# Patient Record
Sex: FEMALE | Race: BLACK OR AFRICAN AMERICAN | NOT HISPANIC OR LATINO | Employment: OTHER | URBAN - METROPOLITAN AREA
[De-identification: names, ages, dates, MRNs, and addresses within clinical notes are randomized per-mention and may not be internally consistent; named-entity substitution may affect disease eponyms.]

---

## 2017-02-20 ENCOUNTER — OFFICE VISIT (OUTPATIENT)
Dept: INTERNAL MEDICINE | Facility: CLINIC | Age: 62
End: 2017-02-20
Payer: MEDICARE

## 2017-02-20 ENCOUNTER — LAB VISIT (OUTPATIENT)
Dept: LAB | Facility: HOSPITAL | Age: 62
End: 2017-02-20
Attending: FAMILY MEDICINE
Payer: MEDICARE

## 2017-02-20 VITALS
HEIGHT: 62 IN | WEIGHT: 250.44 LBS | TEMPERATURE: 97 F | SYSTOLIC BLOOD PRESSURE: 112 MMHG | BODY MASS INDEX: 46.09 KG/M2 | OXYGEN SATURATION: 98 % | HEART RATE: 90 BPM | DIASTOLIC BLOOD PRESSURE: 78 MMHG

## 2017-02-20 DIAGNOSIS — R73.02 IGT (IMPAIRED GLUCOSE TOLERANCE): ICD-10-CM

## 2017-02-20 DIAGNOSIS — E78.00 HYPERCHOLESTEREMIA: ICD-10-CM

## 2017-02-20 DIAGNOSIS — I10 ESSENTIAL HYPERTENSION: Primary | ICD-10-CM

## 2017-02-20 DIAGNOSIS — E66.01 MORBID OBESITY, UNSPECIFIED OBESITY TYPE: ICD-10-CM

## 2017-02-20 LAB
CHOLEST/HDLC SERPL: 7.8 {RATIO}
GLUCOSE SERPL-MCNC: 98 MG/DL
HDL/CHOLESTEROL RATIO: 12.8 %
HDLC SERPL-MCNC: 234 MG/DL
HDLC SERPL-MCNC: 30 MG/DL
LDLC SERPL CALC-MCNC: 171.8 MG/DL
NONHDLC SERPL-MCNC: 204 MG/DL
TRIGL SERPL-MCNC: 161 MG/DL

## 2017-02-20 PROCEDURE — 82947 ASSAY GLUCOSE BLOOD QUANT: CPT

## 2017-02-20 PROCEDURE — 36415 COLL VENOUS BLD VENIPUNCTURE: CPT | Mod: PO

## 2017-02-20 PROCEDURE — 80061 LIPID PANEL: CPT

## 2017-02-20 PROCEDURE — 99999 PR PBB SHADOW E&M-EST. PATIENT-LVL III: CPT | Mod: PBBFAC,,, | Performed by: FAMILY MEDICINE

## 2017-02-20 PROCEDURE — 83036 HEMOGLOBIN GLYCOSYLATED A1C: CPT

## 2017-02-20 PROCEDURE — 99214 OFFICE O/P EST MOD 30 MIN: CPT | Mod: S$PBB,,, | Performed by: FAMILY MEDICINE
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
## 2017-02-20 NOTE — MR AVS SNAPSHOT
[facility]  [address] [place] [address]  Phone: [phone]  Fax: [phone]                  Kelsey Terrell   2017 10:20 AM   Office Visit    Description:  Female : 1955   Provider:  Harris Scott MD   Department:  [facility] - Internal Medicine           Diagnoses this Visit        Comments    Essential hypertension    -  Primary     Morbid obesity, unspecified obesity type         Hypercholesteremia                To Do List           Future Appointments        Provider Department Dept Phone    2017 2:20 PM LABORATORY, [facility] [phone]      Goals (5 Years of Data)     None      [facility] On Call     [facility] -  Assistance  Registered nurses in the [facility] provide clinical advisement, health education, appointment booking, and other advisory services.  Call for this free service at [phone].             Medications           Message regarding Medications     Verify the changes and/or additions to your medication regime listed below are the same as discussed with your clinician today.  If any of these changes or additions are incorrect, please notify your healthcare provider.             Verify that the below list of medications is an accurate representation of the medications you are currently taking.  If none reported, the list may be blank. If incorrect, please contact your healthcare provider. Carry this list with you in case of emergency.           Current Medications     aspirin (ECOTRIN) 81 MG EC tablet Take 81 mg by mouth once daily. 1 Tablet, Delayed Release (E.C.) Oral Every day    lorazepam (ATIVAN) 0.5 MG tablet Take 0.5 mg by mouth 2 (two) times daily.     sertraline (ZOLOFT) 100 MG tablet Take 100 mg by mouth every evening.     TRIBENZOR 40-10-25 mg Tab Take 1 tablet by mouth once daily.     venlafaxine (EFFEXOR-XR) 150 MG Cp24 Take by mouth once daily.           "  Clinical Reference Information           Your Vitals Were     BP Pulse Temp Height    112/78 (BP Location: Right arm, Patient Position: Sitting, BP Method: Manual) 90 96.8 °F (36 °C) (Tympanic) 5' 2" (1.575 m)    Weight SpO2 BMI    113.6 kg (250 lb 7.1 oz) 98% 45.81 kg/m2      Blood Pressure          Most Recent Value    BP  112/78      Allergies as of 2/20/2017     No Known Allergies      Immunizations Administered on Date of Encounter - 2/20/2017     None      Language Assistance Services     ATTENTION: Language assistance services are available, free of charge. Please call [phone].      ATENCIÓN: Si habla español, tiene a wolf disposición servicios gratuitos de asistencia lingüística. Llame al [phone].     CHÚ Ý: N?u b?n nói Ti?ng Vi?t, có các d?ch v? h? tr? ngôn ng? mi?n phí dành cho b?n. G?i s? [phone].         [facility] - Internal Medicine complies with applicable Federal civil rights laws and does not discriminate on the basis of race, color, national origin, age, disability, or sex.        "

## 2017-02-21 LAB
ESTIMATED AVG GLUCOSE: 126 MG/DL
HBA1C MFR BLD HPLC: 6 %

## 2017-02-27 ENCOUNTER — TELEPHONE (OUTPATIENT)
Dept: INTERNAL MEDICINE | Facility: CLINIC | Age: 62
End: 2017-02-27

## 2017-07-07 ENCOUNTER — TELEPHONE (OUTPATIENT)
Dept: INTERNAL MEDICINE | Facility: CLINIC | Age: 62
End: 2017-07-07

## 2018-01-31 ENCOUNTER — PATIENT OUTREACH (OUTPATIENT)
Dept: ADMINISTRATIVE | Facility: HOSPITAL | Age: 63
End: 2018-01-31

## 2018-01-31 NOTE — PROGRESS NOTES
I have attempted without success to contact this patient to schedule an appointment for blood pressure recheck. Patient also due for tetanus, zoster, and influenza vaccinations, and mammogram. Patient not available, left voicemail.

## 2018-06-18 DIAGNOSIS — Z12.39 BREAST CANCER SCREENING: ICD-10-CM

## 2019-02-05 ENCOUNTER — LAB VISIT (OUTPATIENT)
Dept: LAB | Facility: HOSPITAL | Age: 64
End: 2019-02-05
Attending: FAMILY MEDICINE
Payer: MEDICARE

## 2019-02-05 ENCOUNTER — OFFICE VISIT (OUTPATIENT)
Dept: INTERNAL MEDICINE | Facility: CLINIC | Age: 64
End: 2019-02-05
Payer: MEDICARE

## 2019-02-05 VITALS
BODY MASS INDEX: 41.62 KG/M2 | WEIGHT: 226.19 LBS | HEIGHT: 62 IN | OXYGEN SATURATION: 96 % | DIASTOLIC BLOOD PRESSURE: 70 MMHG | SYSTOLIC BLOOD PRESSURE: 108 MMHG | HEART RATE: 95 BPM | TEMPERATURE: 98 F

## 2019-02-05 DIAGNOSIS — Z12.31 ENCOUNTER FOR SCREENING MAMMOGRAM FOR MALIGNANT NEOPLASM OF BREAST: ICD-10-CM

## 2019-02-05 DIAGNOSIS — R73.02 IGT (IMPAIRED GLUCOSE TOLERANCE): ICD-10-CM

## 2019-02-05 DIAGNOSIS — Z00.00 ROUTINE HEALTH MAINTENANCE: Primary | ICD-10-CM

## 2019-02-05 DIAGNOSIS — R63.4 LOSS OF WEIGHT: ICD-10-CM

## 2019-02-05 DIAGNOSIS — I10 ESSENTIAL HYPERTENSION: ICD-10-CM

## 2019-02-05 DIAGNOSIS — F32.A DEPRESSION, UNSPECIFIED DEPRESSION TYPE: ICD-10-CM

## 2019-02-05 DIAGNOSIS — E66.01 MORBID OBESITY: ICD-10-CM

## 2019-02-05 LAB
ALBUMIN SERPL BCP-MCNC: 3.7 G/DL
ALP SERPL-CCNC: 76 U/L
ALT SERPL W/O P-5'-P-CCNC: 14 U/L
ANION GAP SERPL CALC-SCNC: 6 MMOL/L
AST SERPL-CCNC: 18 U/L
BASOPHILS # BLD AUTO: 0.03 K/UL
BASOPHILS NFR BLD: 0.4 %
BILIRUB SERPL-MCNC: 0.5 MG/DL
BUN SERPL-MCNC: 14 MG/DL
CALCIUM SERPL-MCNC: 10 MG/DL
CHLORIDE SERPL-SCNC: 101 MMOL/L
CHOLEST SERPL-MCNC: 240 MG/DL
CHOLEST/HDLC SERPL: 5.6 {RATIO}
CO2 SERPL-SCNC: 33 MMOL/L
CREAT SERPL-MCNC: 0.9 MG/DL
DIFFERENTIAL METHOD: ABNORMAL
EOSINOPHIL # BLD AUTO: 0.1 K/UL
EOSINOPHIL NFR BLD: 2 %
ERYTHROCYTE [DISTWIDTH] IN BLOOD BY AUTOMATED COUNT: 14.8 %
EST. GFR  (AFRICAN AMERICAN): >60 ML/MIN/1.73 M^2
EST. GFR  (NON AFRICAN AMERICAN): >60 ML/MIN/1.73 M^2
ESTIMATED AVG GLUCOSE: 105 MG/DL
GLUCOSE SERPL-MCNC: 92 MG/DL
HBA1C MFR BLD HPLC: 5.3 %
HCT VFR BLD AUTO: 39.8 %
HDLC SERPL-MCNC: 43 MG/DL
HDLC SERPL: 17.9 %
HGB BLD-MCNC: 12 G/DL
IMM GRANULOCYTES # BLD AUTO: 0.02 K/UL
IMM GRANULOCYTES NFR BLD AUTO: 0.3 %
LDLC SERPL CALC-MCNC: 170 MG/DL
LYMPHOCYTES # BLD AUTO: 2.1 K/UL
LYMPHOCYTES NFR BLD: 30.3 %
MCH RBC QN AUTO: 25.4 PG
MCHC RBC AUTO-ENTMCNC: 30.2 G/DL
MCV RBC AUTO: 84 FL
MONOCYTES # BLD AUTO: 0.5 K/UL
MONOCYTES NFR BLD: 7.5 %
NEUTROPHILS # BLD AUTO: 4.1 K/UL
NEUTROPHILS NFR BLD: 59.5 %
NONHDLC SERPL-MCNC: 197 MG/DL
NRBC BLD-RTO: 0 /100 WBC
PLATELET # BLD AUTO: 353 K/UL
PMV BLD AUTO: 10.7 FL
POTASSIUM SERPL-SCNC: 3.8 MMOL/L
PROT SERPL-MCNC: 7.7 G/DL
RBC # BLD AUTO: 4.73 M/UL
SODIUM SERPL-SCNC: 140 MMOL/L
TRIGL SERPL-MCNC: 135 MG/DL
TSH SERPL DL<=0.005 MIU/L-ACNC: 2.2 UIU/ML
WBC # BLD AUTO: 6.9 K/UL

## 2019-02-05 PROCEDURE — 84443 ASSAY THYROID STIM HORMONE: CPT

## 2019-02-05 PROCEDURE — 80061 LIPID PANEL: CPT

## 2019-02-05 PROCEDURE — 99999 PR PBB SHADOW E&M-EST. PATIENT-LVL III: CPT | Mod: PBBFAC,,, | Performed by: FAMILY MEDICINE

## 2019-02-05 PROCEDURE — 99396 PR PREVENTIVE VISIT,EST,40-64: ICD-10-PCS | Mod: S$PBB,,, | Performed by: FAMILY MEDICINE

## 2019-02-05 PROCEDURE — 99999 PR PBB SHADOW E&M-EST. PATIENT-LVL III: ICD-10-PCS | Mod: PBBFAC,,, | Performed by: FAMILY MEDICINE

## 2019-02-05 PROCEDURE — 83036 HEMOGLOBIN GLYCOSYLATED A1C: CPT

## 2019-02-05 PROCEDURE — 99396 PREV VISIT EST AGE 40-64: CPT | Mod: S$PBB,,, | Performed by: FAMILY MEDICINE

## 2019-02-05 PROCEDURE — 85025 COMPLETE CBC W/AUTO DIFF WBC: CPT

## 2019-02-05 PROCEDURE — 80053 COMPREHEN METABOLIC PANEL: CPT

## 2019-02-05 PROCEDURE — 99213 OFFICE O/P EST LOW 20 MIN: CPT | Mod: PBBFAC,PN | Performed by: FAMILY MEDICINE

## 2019-02-05 PROCEDURE — 36415 COLL VENOUS BLD VENIPUNCTURE: CPT

## 2019-02-05 RX ORDER — OLMESARTAN MEDOXOMIL / AMLODIPINE BESYLATE / HYDROCHLOROTHIAZIDE 40; 10; 12.5 MG/1; MG/1; MG/1
1 TABLET, FILM COATED ORAL DAILY
COMMUNITY
Start: 2019-01-24 | End: 2024-02-08 | Stop reason: DRUGHIGH
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
## 2019-03-19 ENCOUNTER — HOSPITAL ENCOUNTER (OUTPATIENT)
Dept: RADIOLOGY | Facility: HOSPITAL | Age: 64
Discharge: HOME | End: 2019-03-19
Attending: FAMILY MEDICINE
Payer: MEDICARE

## 2019-03-19 ENCOUNTER — OFFICE VISIT (OUTPATIENT)
Dept: INTERNAL MEDICINE | Facility: CLINIC | Age: 64
End: 2019-03-19
Payer: MEDICARE

## 2019-03-19 VITALS — HEIGHT: 62 IN | BODY MASS INDEX: 41.59 KG/M2 | WEIGHT: 226 LBS

## 2019-03-19 VITALS
SYSTOLIC BLOOD PRESSURE: 116 MMHG | HEIGHT: 62 IN | DIASTOLIC BLOOD PRESSURE: 78 MMHG | OXYGEN SATURATION: 99 % | WEIGHT: 233.25 LBS | HEART RATE: 88 BPM | BODY MASS INDEX: 42.92 KG/M2 | TEMPERATURE: 97 F

## 2019-03-19 DIAGNOSIS — R79.89 ELEVATED PLATELET COUNT: ICD-10-CM

## 2019-03-19 DIAGNOSIS — E78.00 HYPERCHOLESTEREMIA: ICD-10-CM

## 2019-03-19 DIAGNOSIS — Z09 FOLLOW UP: Primary | ICD-10-CM

## 2019-03-19 DIAGNOSIS — Z12.31 ENCOUNTER FOR SCREENING MAMMOGRAM FOR MALIGNANT NEOPLASM OF BREAST: ICD-10-CM

## 2019-03-19 DIAGNOSIS — I10 ESSENTIAL HYPERTENSION: ICD-10-CM

## 2019-03-19 PROCEDURE — 99999 PR PBB SHADOW E&M-EST. PATIENT-LVL IV: ICD-10-PCS | Mod: PBBFAC,,, | Performed by: NURSE PRACTITIONER

## 2019-03-19 PROCEDURE — 99213 PR OFFICE/OUTPT VISIT, EST, LEVL III, 20-29 MIN: ICD-10-PCS | Mod: S$PBB,,, | Performed by: NURSE PRACTITIONER

## 2019-03-19 PROCEDURE — 99999 PR PBB SHADOW E&M-EST. PATIENT-LVL IV: CPT | Mod: PBBFAC,,, | Performed by: NURSE PRACTITIONER

## 2019-03-19 PROCEDURE — 99214 OFFICE O/P EST MOD 30 MIN: CPT | Mod: PBBFAC,PN | Performed by: NURSE PRACTITIONER

## 2019-03-19 PROCEDURE — 77067 MAMMO DIGITAL SCREENING BILAT WITH CAD: ICD-10-PCS | Mod: 26,,, | Performed by: RADIOLOGY

## 2019-03-19 PROCEDURE — 77067 SCR MAMMO BI INCL CAD: CPT | Mod: 26,,, | Performed by: RADIOLOGY

## 2019-03-19 PROCEDURE — 77067 SCR MAMMO BI INCL CAD: CPT | Mod: TC

## 2019-03-19 PROCEDURE — 99213 OFFICE O/P EST LOW 20 MIN: CPT | Mod: S$PBB,,, | Performed by: NURSE PRACTITIONER
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
## 2020-10-01 ENCOUNTER — PATIENT MESSAGE (OUTPATIENT)
Dept: OTHER | Facility: OTHER | Age: 65
End: 2020-10-01

## 2020-10-06 ENCOUNTER — PATIENT MESSAGE (OUTPATIENT)
Dept: ADMINISTRATIVE | Facility: HOSPITAL | Age: 65
End: 2020-10-06

## 2020-12-11 ENCOUNTER — PATIENT MESSAGE (OUTPATIENT)
Dept: OTHER | Facility: OTHER | Age: 65
End: 2020-12-11

## 2021-01-14 ENCOUNTER — PATIENT OUTREACH (OUTPATIENT)
Dept: ADMINISTRATIVE | Facility: OTHER | Age: 66
End: 2021-01-14

## 2021-01-14 DIAGNOSIS — Z12.31 BREAST CANCER SCREENING BY MAMMOGRAM: Primary | ICD-10-CM

## 2021-01-19 ENCOUNTER — OFFICE VISIT (OUTPATIENT)
Dept: OPHTHALMOLOGY | Facility: CLINIC | Age: 66
End: 2021-01-19
Payer: MEDICARE

## 2021-01-19 DIAGNOSIS — H25.13 AGE-RELATED NUCLEAR CATARACT OF BOTH EYES: Primary | ICD-10-CM

## 2021-01-19 DIAGNOSIS — H35.033 HYPERTENSIVE RETINOPATHY OF BOTH EYES, GRADE 1: ICD-10-CM

## 2021-01-19 PROCEDURE — 99999 PR PBB SHADOW E&M-EST. PATIENT-LVL III: ICD-10-PCS | Mod: PBBFAC,,, | Performed by: OPHTHALMOLOGY

## 2021-01-19 PROCEDURE — 99999 PR PBB SHADOW E&M-EST. PATIENT-LVL III: CPT | Mod: PBBFAC,,, | Performed by: OPHTHALMOLOGY

## 2021-01-19 PROCEDURE — 92004 PR EYE EXAM, NEW PATIENT,COMPREHESV: ICD-10-PCS | Mod: S$PBB,,, | Performed by: OPHTHALMOLOGY

## 2021-01-19 PROCEDURE — 99213 OFFICE O/P EST LOW 20 MIN: CPT | Mod: PBBFAC,PO | Performed by: OPHTHALMOLOGY

## 2021-01-19 PROCEDURE — 92004 COMPRE OPH EXAM NEW PT 1/>: CPT | Mod: S$PBB,,, | Performed by: OPHTHALMOLOGY

## 2021-01-19 PROCEDURE — 92015 DETERMINE REFRACTIVE STATE: CPT | Mod: ,,, | Performed by: OPHTHALMOLOGY

## 2021-01-19 PROCEDURE — 92015 PR REFRACTION: ICD-10-PCS | Mod: ,,, | Performed by: OPHTHALMOLOGY

## 2021-03-25 ENCOUNTER — PATIENT MESSAGE (OUTPATIENT)
Dept: ADMINISTRATIVE | Facility: HOSPITAL | Age: 66
End: 2021-03-25

## 2021-04-28 ENCOUNTER — PATIENT MESSAGE (OUTPATIENT)
Dept: RESEARCH | Facility: HOSPITAL | Age: 66
End: 2021-04-28

## 2022-02-09 ENCOUNTER — TELEPHONE (OUTPATIENT)
Dept: INTERNAL MEDICINE | Facility: CLINIC | Age: 67
End: 2022-02-09
Payer: MEDICARE

## 2022-02-09 NOTE — TELEPHONE ENCOUNTER
Patient has an appt scheduled with Dr. Scott on 3/28/2022. Will discuss mammogram with provider during visit.//ddw

## 2022-02-09 NOTE — TELEPHONE ENCOUNTER
Radiology is requesting that a mammogram digital diagnostic bilateral with ultrasound of left breast be placed for patient. Please advise.//ddw

## 2022-04-19 ENCOUNTER — PATIENT MESSAGE (OUTPATIENT)
Dept: INTERNAL MEDICINE | Facility: CLINIC | Age: 67
End: 2022-04-19
Payer: MEDICARE

## 2022-04-19 ENCOUNTER — TELEPHONE (OUTPATIENT)
Dept: INTERNAL MEDICINE | Facility: CLINIC | Age: 67
End: 2022-04-19
Payer: MEDICARE

## 2022-09-20 ENCOUNTER — TELEPHONE (OUTPATIENT)
Dept: INTERNAL MEDICINE | Facility: CLINIC | Age: 67
End: 2022-09-20
Payer: MEDICARE

## 2022-12-20 DIAGNOSIS — I10 HYPERTENSION: ICD-10-CM

## 2023-01-03 ENCOUNTER — OFFICE VISIT (OUTPATIENT)
Dept: INTERNAL MEDICINE | Facility: CLINIC | Age: 68
End: 2023-01-03
Payer: MEDICARE

## 2023-01-03 ENCOUNTER — LAB VISIT (OUTPATIENT)
Dept: LAB | Facility: HOSPITAL | Age: 68
End: 2023-01-03
Attending: FAMILY MEDICINE
Payer: MEDICARE

## 2023-01-03 VITALS
DIASTOLIC BLOOD PRESSURE: 92 MMHG | OXYGEN SATURATION: 95 % | BODY MASS INDEX: 47.67 KG/M2 | SYSTOLIC BLOOD PRESSURE: 146 MMHG | WEIGHT: 259.06 LBS | HEIGHT: 62 IN | HEART RATE: 99 BPM | TEMPERATURE: 99 F

## 2023-01-03 DIAGNOSIS — Z12.31 ENCOUNTER FOR SCREENING MAMMOGRAM FOR MALIGNANT NEOPLASM OF BREAST: ICD-10-CM

## 2023-01-03 DIAGNOSIS — E66.01 MORBID OBESITY: ICD-10-CM

## 2023-01-03 DIAGNOSIS — R73.03 PREDIABETES: ICD-10-CM

## 2023-01-03 DIAGNOSIS — E78.00 HYPERCHOLESTEREMIA: ICD-10-CM

## 2023-01-03 DIAGNOSIS — Z11.4 ENCOUNTER FOR SCREENING FOR HUMAN IMMUNODEFICIENCY VIRUS (HIV): ICD-10-CM

## 2023-01-03 DIAGNOSIS — I10 PRIMARY HYPERTENSION: Primary | ICD-10-CM

## 2023-01-03 DIAGNOSIS — I10 PRIMARY HYPERTENSION: ICD-10-CM

## 2023-01-03 DIAGNOSIS — I10 HYPERTENSION: ICD-10-CM

## 2023-01-03 DIAGNOSIS — N64.4 BREAST PAIN, LEFT: ICD-10-CM

## 2023-01-03 DIAGNOSIS — Z78.0 ASYMPTOMATIC POSTMENOPAUSAL STATUS: ICD-10-CM

## 2023-01-03 LAB
ALBUMIN SERPL BCP-MCNC: 4 G/DL (ref 3.5–5.2)
ALBUMIN SERPL BCP-MCNC: 4 G/DL (ref 3.5–5.2)
ALP SERPL-CCNC: 90 U/L (ref 55–135)
ALP SERPL-CCNC: 90 U/L (ref 55–135)
ALT SERPL W/O P-5'-P-CCNC: 12 U/L (ref 10–44)
ALT SERPL W/O P-5'-P-CCNC: 12 U/L (ref 10–44)
ANION GAP SERPL CALC-SCNC: 9 MMOL/L (ref 8–16)
ANION GAP SERPL CALC-SCNC: 9 MMOL/L (ref 8–16)
AST SERPL-CCNC: 14 U/L (ref 10–40)
AST SERPL-CCNC: 14 U/L (ref 10–40)
BILIRUB SERPL-MCNC: 0.5 MG/DL (ref 0.1–1)
BILIRUB SERPL-MCNC: 0.5 MG/DL (ref 0.1–1)
BUN SERPL-MCNC: 16 MG/DL (ref 8–23)
BUN SERPL-MCNC: 16 MG/DL (ref 8–23)
CALCIUM SERPL-MCNC: 9.8 MG/DL (ref 8.7–10.5)
CALCIUM SERPL-MCNC: 9.8 MG/DL (ref 8.7–10.5)
CHLORIDE SERPL-SCNC: 101 MMOL/L (ref 95–110)
CHLORIDE SERPL-SCNC: 101 MMOL/L (ref 95–110)
CHOLEST SERPL-MCNC: 252 MG/DL (ref 120–199)
CHOLEST/HDLC SERPL: 6.1 {RATIO} (ref 2–5)
CO2 SERPL-SCNC: 29 MMOL/L (ref 23–29)
CO2 SERPL-SCNC: 29 MMOL/L (ref 23–29)
CREAT SERPL-MCNC: 0.9 MG/DL (ref 0.5–1.4)
CREAT SERPL-MCNC: 0.9 MG/DL (ref 0.5–1.4)
EST. GFR  (NO RACE VARIABLE): >60 ML/MIN/1.73 M^2
EST. GFR  (NO RACE VARIABLE): >60 ML/MIN/1.73 M^2
ESTIMATED AVG GLUCOSE: 143 MG/DL (ref 68–131)
GLUCOSE SERPL-MCNC: 129 MG/DL (ref 70–110)
GLUCOSE SERPL-MCNC: 129 MG/DL (ref 70–110)
HBA1C MFR BLD: 6.6 % (ref 4–5.6)
HDLC SERPL-MCNC: 41 MG/DL (ref 40–75)
HDLC SERPL: 16.3 % (ref 20–50)
HIV 1+2 AB+HIV1 P24 AG SERPL QL IA: NORMAL
LDLC SERPL CALC-MCNC: 190 MG/DL (ref 63–159)
NONHDLC SERPL-MCNC: 211 MG/DL
POTASSIUM SERPL-SCNC: 3.6 MMOL/L (ref 3.5–5.1)
POTASSIUM SERPL-SCNC: 3.6 MMOL/L (ref 3.5–5.1)
PROT SERPL-MCNC: 8.3 G/DL (ref 6–8.4)
PROT SERPL-MCNC: 8.3 G/DL (ref 6–8.4)
SODIUM SERPL-SCNC: 139 MMOL/L (ref 136–145)
SODIUM SERPL-SCNC: 139 MMOL/L (ref 136–145)
TRIGL SERPL-MCNC: 105 MG/DL (ref 30–150)

## 2023-01-03 PROCEDURE — 80061 LIPID PANEL: CPT | Performed by: FAMILY MEDICINE

## 2023-01-03 PROCEDURE — 99214 OFFICE O/P EST MOD 30 MIN: CPT | Mod: S$PBB,,, | Performed by: FAMILY MEDICINE

## 2023-01-03 PROCEDURE — 99999 PR PBB SHADOW E&M-EST. PATIENT-LVL IV: CPT | Mod: PBBFAC,,, | Performed by: FAMILY MEDICINE

## 2023-01-03 PROCEDURE — 83036 HEMOGLOBIN GLYCOSYLATED A1C: CPT | Performed by: FAMILY MEDICINE

## 2023-01-03 PROCEDURE — 36415 COLL VENOUS BLD VENIPUNCTURE: CPT | Performed by: FAMILY MEDICINE

## 2023-01-03 PROCEDURE — 99214 PR OFFICE/OUTPT VISIT, EST, LEVL IV, 30-39 MIN: ICD-10-PCS | Mod: S$PBB,,, | Performed by: FAMILY MEDICINE

## 2023-01-03 PROCEDURE — 99214 OFFICE O/P EST MOD 30 MIN: CPT | Mod: PBBFAC | Performed by: FAMILY MEDICINE

## 2023-01-03 PROCEDURE — 80053 COMPREHEN METABOLIC PANEL: CPT | Performed by: FAMILY MEDICINE

## 2023-01-03 PROCEDURE — 99999 PR PBB SHADOW E&M-EST. PATIENT-LVL IV: ICD-10-PCS | Mod: PBBFAC,,, | Performed by: FAMILY MEDICINE

## 2023-01-03 PROCEDURE — 87389 HIV-1 AG W/HIV-1&-2 AB AG IA: CPT | Performed by: FAMILY MEDICINE

## 2023-01-03 RX ORDER — LORAZEPAM 0.5 MG/1
1 TABLET ORAL 3 TIMES DAILY
COMMUNITY
Start: 2021-03-12

## 2023-01-03 RX ORDER — VENLAFAXINE HYDROCHLORIDE 75 MG/1
75 CAPSULE, EXTENDED RELEASE ORAL
COMMUNITY
Start: 2022-12-27
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
## 2023-01-04 PROBLEM — R73.03 PREDIABETES: Status: ACTIVE | Noted: 2023-01-04

## 2023-01-06 ENCOUNTER — APPOINTMENT (OUTPATIENT)
Dept: RADIOLOGY | Facility: HOSPITAL | Age: 68
End: 2023-01-06
Attending: FAMILY MEDICINE
Payer: MEDICARE

## 2023-01-06 DIAGNOSIS — Z78.0 ASYMPTOMATIC POSTMENOPAUSAL STATUS: ICD-10-CM

## 2023-01-06 PROCEDURE — 77080 DXA BONE DENSITY AXIAL: CPT | Mod: TC

## 2023-01-06 PROCEDURE — 77080 DXA BONE DENSITY AXIAL: CPT | Mod: 26,,, | Performed by: RADIOLOGY

## 2023-01-06 PROCEDURE — 77080 DEXA BONE DENSITY SPINE HIP: ICD-10-PCS | Mod: 26,,, | Performed by: RADIOLOGY

## 2023-01-13 ENCOUNTER — TELEPHONE (OUTPATIENT)
Dept: INTERNAL MEDICINE | Facility: CLINIC | Age: 68
End: 2023-01-13
Payer: MEDICARE

## 2023-02-23 ENCOUNTER — PATIENT MESSAGE (OUTPATIENT)
Dept: ADMINISTRATIVE | Facility: OTHER | Age: 68
End: 2023-02-23
Payer: MEDICARE

## 2023-02-23 ENCOUNTER — OFFICE VISIT (OUTPATIENT)
Dept: INTERNAL MEDICINE | Facility: CLINIC | Age: 68
End: 2023-02-23
Payer: MEDICARE

## 2023-02-23 VITALS
HEART RATE: 103 BPM | TEMPERATURE: 98 F | OXYGEN SATURATION: 100 % | WEIGHT: 250.69 LBS | DIASTOLIC BLOOD PRESSURE: 90 MMHG | SYSTOLIC BLOOD PRESSURE: 140 MMHG | BODY MASS INDEX: 45.85 KG/M2

## 2023-02-23 DIAGNOSIS — R73.03 PREDIABETES: Primary | ICD-10-CM

## 2023-02-23 DIAGNOSIS — I10 PRIMARY HYPERTENSION: ICD-10-CM

## 2023-02-23 DIAGNOSIS — F33.1 MODERATE EPISODE OF RECURRENT MAJOR DEPRESSIVE DISORDER: ICD-10-CM

## 2023-02-23 DIAGNOSIS — E78.00 HYPERCHOLESTEREMIA: ICD-10-CM

## 2023-02-23 DIAGNOSIS — E66.01 MORBID OBESITY: ICD-10-CM

## 2023-02-23 PROCEDURE — 90677 PCV20 VACCINE IM: CPT | Mod: PBBFAC

## 2023-02-23 PROCEDURE — 99999 PR PBB SHADOW E&M-EST. PATIENT-LVL IV: CPT | Mod: PBBFAC,,, | Performed by: FAMILY MEDICINE

## 2023-02-23 PROCEDURE — 99214 OFFICE O/P EST MOD 30 MIN: CPT | Mod: PBBFAC,25 | Performed by: FAMILY MEDICINE

## 2023-02-23 PROCEDURE — 99214 PR OFFICE/OUTPT VISIT, EST, LEVL IV, 30-39 MIN: ICD-10-PCS | Mod: S$PBB,,, | Performed by: FAMILY MEDICINE

## 2023-02-23 PROCEDURE — 99214 OFFICE O/P EST MOD 30 MIN: CPT | Mod: S$PBB,,, | Performed by: FAMILY MEDICINE

## 2023-02-23 PROCEDURE — G0008 ADMIN INFLUENZA VIRUS VAC: HCPCS | Mod: PBBFAC

## 2023-02-23 PROCEDURE — 99999 PR PBB SHADOW E&M-EST. PATIENT-LVL IV: ICD-10-PCS | Mod: PBBFAC,,, | Performed by: FAMILY MEDICINE

## 2023-02-23 RX ORDER — PRAVASTATIN SODIUM 40 MG/1
40 TABLET ORAL DAILY
Qty: 90 TABLET | Refills: 3 | Status: SHIPPED | OUTPATIENT
Start: 2023-02-23 | End: 2023-02-23

## 2023-02-23 RX ORDER — ATORVASTATIN CALCIUM 40 MG/1
40 TABLET, FILM COATED ORAL DAILY
Qty: 90 TABLET | Refills: 3 | Status: SHIPPED | OUTPATIENT
Start: 2023-02-23 | End: 2023-12-29

## 2023-02-23 NOTE — PATIENT INSTRUCTIONS
Shingrix new shingles vaccine  via a pharmacy   Covid booster via pharmacy  Bring in your home blood pressure recordings  DO NOT take pravastatin  Please DO take atorvastatin

## 2023-02-23 NOTE — PROGRESS NOTES
Subjective:      Patient ID: Kelsey Terrell is a female.    Chief Complaint: Multiple issues see below      HPI hypertension taking medication     Depression stable see psychiatrist   Elev chol d/wd gene testing . Statin Side effects and dosing discussed  Ok w taking now    Morbid obesity : we discussed need for  weight loss via health diet/portion control and if able then  exercise    Prediab: a1c 6.6 d/wd stvee    Past Medical History:   Diagnosis Date    Chronic bronchitis     Chronic depression     dr montenegro    Difficult intubation     History of Bell's palsy 1980's    Left side    Hypercholesteremia     Hypertension     dr roland also    Mitral regurgitation     Morbid obesity       Past Surgical History:   Procedure Laterality Date    BREAST BIOPSY Left     benign-pt was 20 yrs old    closed reduction shoulder dislocation Left     HYSTERECTOMY      noncancer    OOPHORECTOMY      TONSILLECTOMY      TUBAL LIGATION        Social History     Socioeconomic History    Marital status:    Tobacco Use    Smoking status: Never    Smokeless tobacco: Never   Substance and Sexual Activity    Alcohol use: No    Drug use: No      Family History   Problem Relation Age of Onset    Cataracts Mother     Hypertension Father     Throat cancer Father     Diabetes Daughter     Clotting disorder Daughter     Glaucoma Neg Hx     Macular degeneration Neg Hx     Retinal detachment Neg Hx     Strabismus Neg Hx       Review of Systems        Objective:     Physical Exam  Vitals and nursing note reviewed.   Constitutional:       General: She is not in acute distress.     Appearance: She is well-developed.   HENT:      Head: Atraumatic.      Right Ear: External ear normal.      Left Ear: External ear normal.      Nose: Nose normal.      Mouth/Throat:      Pharynx: No oropharyngeal exudate.   Eyes:      General: No scleral icterus.     Conjunctiva/sclera: Conjunctivae normal.      Pupils: Pupils are equal, round, and reactive to  light.   Neck:      Thyroid: No thyromegaly.   Cardiovascular:      Rate and Rhythm: Normal rate and regular rhythm.      Heart sounds: Normal heart sounds. No murmur heard.  Pulmonary:      Effort: Pulmonary effort is normal. No respiratory distress.      Breath sounds: Normal breath sounds. No wheezing or rales.   Abdominal:      General: Bowel sounds are normal. There is no distension.      Palpations: Abdomen is soft. There is no mass.      Tenderness: There is no abdominal tenderness. There is no guarding or rebound.   Musculoskeletal:         General: No tenderness. Normal range of motion.      Cervical back: Normal range of motion and neck supple.   Lymphadenopathy:      Cervical: No cervical adenopathy.   Skin:     General: Skin is warm.      Coloration: Skin is not pale.      Findings: No erythema or rash.   Neurological:      Mental Status: She is alert and oriented to person, place, and time.      Cranial Nerves: No cranial nerve deficit.      Motor: No abnormal muscle tone.      Coordination: Coordination normal.   Psychiatric:         Behavior: Behavior normal.         Thought Content: Thought content normal.         Judgment: Judgment normal.   Assessment:         ICD-10-CM ICD-9-CM   1. Primary hypertension  I10 401.9   2. Morbid obesity  E66.01 278.01   3. Hypercholesteremia  E78.00 272.0   4. Encounter for screening for human immunodeficiency virus (HIV)  Z11.4 V73.89   5. Encounter for screening mammogram for malignant neoplasm of breast  Z12.31 V76.12   6. Asymptomatic postmenopausal status  Z78.0 V49.81   7. Breast pain, left  N64.4 611.71    prediab  Plan:    F/u card,( psych when due  1. Prediabetes  -     Hemoglobin A1C; Future; Expected date: 02/23/2023  -     Glucose, Fasting; Future; Expected date: 02/23/2023    2. Primary hypertension  -     Hypertension Digital Medicine (HDMP) Enrollment Order  -     Hypertension Digital Medicine (HDMP): Assign Onboarding Questionnaires    3.  Hypercholesteremia  -     Ambulatory referral/consult to Genetics; Future; Expected date: 03/02/2023    4. Morbid obesity    5. Moderate episode of recurrent major depressive disorder    Other orders  -     (In Office Administered) Pneumococcal Conjugate Vaccine (20 Valent) (IM)  -     Discontinue: pravastatin (PRAVACHOL) 40 MG tablet; Take 1 tablet (40 mg total) by mouth once daily.  Dispense: 90 tablet; Refill: 3  -     atorvastatin (LIPITOR) 40 MG tablet; Take 1 tablet (40 mg total) by mouth once daily.  Dispense: 90 tablet; Refill: 3  -     Influenza - Quadrivalent (Adjuvanted)        Statin Side effects and dosing discussed    hingrix new shingles vaccine  via a pharmacy   Covid booster via pharmacy  Bring in your home blood pressure recordings  DO NOT take pravastatin  Please DO take atorvastatin    Spoke w  her daughter today while with patient . Via phone    Lab few days prior to Gretel's appt in March  Flu shot  Access to care form for her daughter in [place]  Please call her pharmacy to cancel pravastatin rx

## 2023-02-27 ENCOUNTER — OFFICE VISIT (OUTPATIENT)
Dept: SURGERY | Facility: CLINIC | Age: 68
End: 2023-02-27
Payer: MEDICARE

## 2023-02-27 ENCOUNTER — LAB VISIT (OUTPATIENT)
Dept: LAB | Facility: HOSPITAL | Age: 68
End: 2023-02-27
Attending: FAMILY MEDICINE
Payer: MEDICARE

## 2023-02-27 DIAGNOSIS — Z91.89 AT HIGH RISK FOR BREAST CANCER: ICD-10-CM

## 2023-02-27 DIAGNOSIS — N64.4 BREAST PAIN, LEFT: Primary | ICD-10-CM

## 2023-02-27 DIAGNOSIS — R73.03 PREDIABETES: ICD-10-CM

## 2023-02-27 LAB
ESTIMATED AVG GLUCOSE: 128 MG/DL (ref 68–131)
HBA1C MFR BLD: 6.1 % (ref 4–5.6)

## 2023-02-27 PROCEDURE — 99213 OFFICE O/P EST LOW 20 MIN: CPT | Mod: PBBFAC | Performed by: SURGERY

## 2023-02-27 PROCEDURE — 82947 ASSAY GLUCOSE BLOOD QUANT: CPT | Performed by: FAMILY MEDICINE

## 2023-02-27 PROCEDURE — 83036 HEMOGLOBIN GLYCOSYLATED A1C: CPT | Performed by: FAMILY MEDICINE

## 2023-02-27 PROCEDURE — 99999 PR PBB SHADOW E&M-EST. PATIENT-LVL III: ICD-10-PCS | Mod: PBBFAC,,, | Performed by: SURGERY

## 2023-02-27 PROCEDURE — 99999 PR PBB SHADOW E&M-EST. PATIENT-LVL III: CPT | Mod: PBBFAC,,, | Performed by: SURGERY

## 2023-02-27 PROCEDURE — 99203 OFFICE O/P NEW LOW 30 MIN: CPT | Mod: S$PBB,,, | Performed by: SURGERY

## 2023-02-27 PROCEDURE — 36415 COLL VENOUS BLD VENIPUNCTURE: CPT | Performed by: FAMILY MEDICINE

## 2023-02-27 PROCEDURE — 99203 PR OFFICE/OUTPT VISIT, NEW, LEVL III, 30-44 MIN: ICD-10-PCS | Mod: S$PBB,,, | Performed by: SURGERY

## 2023-02-27 NOTE — PROGRESS NOTES
Breast Surgical Oncology  [place]    Date of Service: 2023    SUBJECTIVE:   Chief complaint: breast pain    HISTORY OF PRESENT ILLNESS:   Kelsey Terrell is a 67 y.o. female who is kindly referred by Dr. Harris Scott for left breast pain.    She reports a 1 year history of left breast pain which is intermittent in nature.  The pain is focused in the upper outer quadrant and axilla.  She does have a history of an excisional biopsy in her late teens at [facility] for benign pathology. She denies breast concerns such as pain, masses, skin changes, nipple discharge, nipple retraction or lumps under the arm.  She does not wear supportive bra off and does drink 1 cup of caffeine per day.    She has a family history significant for 2 daughters with PTEN pathogenic gene mutations.  Her daughters genetic testing was performed at [facility] and believed to have an inherited from her maternal lineage.  The patient has never had formal genetic counseling or testing.  She is not up-to-date with breast cancer screening.  Her last screening mammogram was in 2019.    Her breast cancer risk factor profile is as follows: Menarche at 12, Menopause at 47.  She is . Age at first live birth was 17. Family history of cancer is as follows: father with head/neck cancer at age 49,  at age 50; daughter with parathyroid cancer (PTEN associated) at age 40,  at age 49.    FAMILY HISTORY:     Family History   Problem Relation Age of Onset    Cataracts Mother     Hypertension Father     Throat cancer Father     Diabetes Daughter         also carcinoid ; lilian daugh parathy cancer(?) and thyroid cancer PTEN mutation    Clotting disorder Daughter     Glaucoma Neg Hx     Macular degeneration Neg Hx     Retinal detachment Neg Hx     Strabismus Neg Hx         PAST MEDICAL HISTORY:     Past Medical History:   Diagnosis Date    Chronic bronchitis     Chronic depression     dr montenegro    Difficult intubation     History of  Bell's palsy 1980's    Left side    Hypercholesteremia     Hypertension     dr roland also    Mitral regurgitation     Morbid obesity     Prediabetes        SURGICAL HISTORY:     Past Surgical History:   Procedure Laterality Date    BREAST BIOPSY Left     benign-pt was 20 yrs old    closed reduction shoulder dislocation Left     HYSTERECTOMY      noncancer    OOPHORECTOMY      TONSILLECTOMY      TUBAL LIGATION         SOCIAL HISTORY:     Social History     Tobacco Use    Smoking status: Never    Smokeless tobacco: Never   Substance Use Topics    Alcohol use: No    Drug use: No        MEDICATIONS/ALLERGIES:     Current Outpatient Medications:     aspirin (ECOTRIN) 81 MG EC tablet, Take 81 mg by mouth once daily. 1 Tablet, Delayed Release (E.C.) Oral Every day, Disp: , Rfl:     atorvastatin (LIPITOR) 40 MG tablet, Take 1 tablet (40 mg total) by mouth once daily., Disp: 90 tablet, Rfl: 3    LORazepam (ATIVAN) 0.5 MG tablet, Take 1 tablet by mouth 3 (three) times daily., Disp: , Rfl:     TRIBENZOR 40-10-12.5 mg Tab, Take 1 tablet by mouth once daily., Disp: , Rfl:     venlafaxine (EFFEXOR-XR) 150 MG Cp24, Take by mouth once daily. , Disp: , Rfl:     venlafaxine (EFFEXOR-XR) 75 MG 24 hr capsule, Take 75 mg by mouth., Disp: , Rfl:   Review of patient's allergies indicates:  No Known Allergies    REVIEW OF SYSTEMS:   I have reviewed 12 systems, including 2 points per system. Pertinent reported positives are:  Depressed mood, anxiety    PHYSICAL EXAM:   General: The patient appears well and is in no acute distress.     Chaperon present for examination.   BREAST EXAM  No Asymmetry  Right:  - Mass: No  - Skin change: No  - Nipple Discharge: No  - Nipple retraction: No  - Axillary LAD: No  Left:   - Mass: No  - Skin change: No, incision noted in upper outer quadrant  - Nipple Discharge: No  - Nipple retraction: No  - Axillary LAD: No    IMAGING:   No breast imaging since 2019    PATHOLOGY:   none    ASSESSMENT:     1. Breast  pain, left    2. At high risk for breast cancer          PLAN:     We have reviewed that breast pain is overwhelmingly benign.  There is no national guideline that mandates routine mammography for the evaluation of generalized  cyclical breast pain.  We reviewed that reducing caffeine intake and vitamin D supplementation can improve the symptom of pain.  Other over the counter remedies include evening primrose oil, iodine supplementation, vitamin E and NSAIDS such as motrin.  She will try these interventions one-by-one to see if she any provide some relief.      I have referred her for genetic counseling and testing based on her family history.  I reviewed the potential cancers associated with a pathogenic mutation in the PTEN gene.  Bilateral diagnostic mammogram has been ordered ASAP.  Should she test positive for pathogenic mutation as well, she would be a candidate for the high risk breast program.    I spent a total of 30 minutes on this visit. This includes face to face time and non-face to face time preparing to see the patient (eg, review of tests), obtaining and/or reviewing separately obtained history, documenting clinical information in the electronic or other health record, independently interpreting results and communicating results to the patient/family/caregiver, or care coordinator.        Helen Wright M.D.

## 2023-02-27 NOTE — PATIENT INSTRUCTIONS
Vitamin D supplementation - 2000 daily  Well fitted bra that keeps the weight of the breasts up, sot hat there is not as much pulling on the upper parts of the breast.   Reduction in caffeine intake - switch to decaf coffee

## 2023-02-28 LAB — GLUCOSE SERPL-MCNC: 108 MG/DL (ref 70–110)

## 2023-03-03 ENCOUNTER — OFFICE VISIT (OUTPATIENT)
Dept: INTERNAL MEDICINE | Facility: CLINIC | Age: 68
End: 2023-03-03
Payer: MEDICARE

## 2023-03-03 VITALS
BODY MASS INDEX: 46.21 KG/M2 | HEIGHT: 62 IN | HEART RATE: 68 BPM | SYSTOLIC BLOOD PRESSURE: 122 MMHG | DIASTOLIC BLOOD PRESSURE: 80 MMHG | WEIGHT: 251.13 LBS | OXYGEN SATURATION: 97 % | TEMPERATURE: 97 F

## 2023-03-03 DIAGNOSIS — E66.01 MORBID OBESITY: ICD-10-CM

## 2023-03-03 DIAGNOSIS — F41.1 GENERALIZED ANXIETY DISORDER: ICD-10-CM

## 2023-03-03 DIAGNOSIS — E78.00 HYPERCHOLESTEREMIA: ICD-10-CM

## 2023-03-03 DIAGNOSIS — E66.01 CLASS 3 SEVERE OBESITY DUE TO EXCESS CALORIES WITH SERIOUS COMORBIDITY AND BODY MASS INDEX (BMI) OF 45.0 TO 49.9 IN ADULT: ICD-10-CM

## 2023-03-03 DIAGNOSIS — R73.03 PREDIABETES: ICD-10-CM

## 2023-03-03 DIAGNOSIS — I10 PRIMARY HYPERTENSION: Primary | ICD-10-CM

## 2023-03-03 DIAGNOSIS — F33.1 MODERATE EPISODE OF RECURRENT MAJOR DEPRESSIVE DISORDER: ICD-10-CM

## 2023-03-03 PROCEDURE — 99214 OFFICE O/P EST MOD 30 MIN: CPT | Mod: PBBFAC | Performed by: PHYSICIAN ASSISTANT

## 2023-03-03 PROCEDURE — 99999 PR PBB SHADOW E&M-EST. PATIENT-LVL IV: ICD-10-PCS | Mod: PBBFAC,,, | Performed by: PHYSICIAN ASSISTANT

## 2023-03-03 PROCEDURE — 99213 PR OFFICE/OUTPT VISIT, EST, LEVL III, 20-29 MIN: ICD-10-PCS | Mod: S$PBB,,, | Performed by: PHYSICIAN ASSISTANT

## 2023-03-03 PROCEDURE — 99999 PR PBB SHADOW E&M-EST. PATIENT-LVL IV: CPT | Mod: PBBFAC,,, | Performed by: PHYSICIAN ASSISTANT

## 2023-03-03 PROCEDURE — 99213 OFFICE O/P EST LOW 20 MIN: CPT | Mod: S$PBB,,, | Performed by: PHYSICIAN ASSISTANT

## 2023-03-03 NOTE — PROGRESS NOTES
Subjective:      Patient ID: Kelsey Terrell is a 67 y.o. female.    Chief Complaint: Follow-up and Annual Exam    HPI  Here today for a follow up for her medical problems.   Daughter passed away last year from parathyroid cancer. P10 gene. Pt getting evaluated with breast cancer specialist to be tested for the gene. Mammogram scheduled.   Up to date with psychiatrist. Recently seen and meds adjusted. Doing well with adjustments.  BP at home running good. Under 140 systolic and under 90 diastolic. Doing well on the tribenzor without any SE. No hypotension.  Seeing specialist for a lesion on the roof of her mouth. Referred by her dentist.   Recently changed cholesterol medication from pravastatin to atorvastatin. Doing well with change. No issues or Se.  Patient Active Problem List   Diagnosis    Hypertension    Morbid obesity    Hypercholesteremia    Ulnar neuropathy    Injury of peripheral nerve plexus    HLD (hyperlipidemia)    Anxiety    Prediabetes    Moderate episode of recurrent major depressive disorder         Current Outpatient Medications:     aspirin (ECOTRIN) 81 MG EC tablet, Take 81 mg by mouth once daily. 1 Tablet, Delayed Release (E.C.) Oral Every day, Disp: , Rfl:     atorvastatin (LIPITOR) 40 MG tablet, Take 1 tablet (40 mg total) by mouth once daily., Disp: 90 tablet, Rfl: 3    LORazepam (ATIVAN) 0.5 MG tablet, Take 1 tablet by mouth 3 (three) times daily., Disp: , Rfl:     TRIBENZOR 40-10-12.5 mg Tab, Take 1 tablet by mouth once daily., Disp: , Rfl:     venlafaxine (EFFEXOR-XR) 150 MG Cp24, Take by mouth once daily. , Disp: , Rfl:     venlafaxine (EFFEXOR-XR) 75 MG 24 hr capsule, Take 75 mg by mouth., Disp: , Rfl:     Review of Systems   Constitutional:  Negative for activity change, appetite change, chills, diaphoresis, fatigue, fever and unexpected weight change.   HENT: Negative.  Negative for congestion, hearing loss, postnasal drip, rhinorrhea, sore throat, trouble swallowing and voice  "change.    Eyes: Negative.  Negative for visual disturbance.   Respiratory: Negative.  Negative for cough, choking, chest tightness and shortness of breath.    Cardiovascular:  Negative for chest pain, palpitations and leg swelling.   Gastrointestinal:  Negative for abdominal distention, abdominal pain, blood in stool, constipation, diarrhea, nausea and vomiting.   Endocrine: Negative for cold intolerance, heat intolerance, polydipsia and polyuria.   Genitourinary: Negative.  Negative for difficulty urinating and frequency.   Musculoskeletal:  Negative for arthralgias, back pain, gait problem, joint swelling and myalgias.   Skin:  Negative for color change, pallor, rash and wound.   Neurological:  Negative for dizziness, tremors, weakness, light-headedness, numbness and headaches.   Hematological:  Negative for adenopathy.   Psychiatric/Behavioral:  Negative for behavioral problems, confusion, self-injury, sleep disturbance and suicidal ideas. The patient is not nervous/anxious.    Objective:   /80 (BP Location: Left arm, Patient Position: Sitting, BP Method: Large (Manual))   Pulse 68   Temp 97.3 °F (36.3 °C) (Tympanic)   Ht 5' 2" (1.575 m)   Wt 113.9 kg (251 lb 1.7 oz)   SpO2 97%   BMI 45.93 kg/m²     Physical Exam  Vitals reviewed.   Constitutional:       General: She is not in acute distress.     Appearance: Normal appearance. She is well-developed. She is not ill-appearing, toxic-appearing or diaphoretic.   HENT:      Head: Normocephalic and atraumatic.      Right Ear: External ear normal.      Left Ear: External ear normal.      Nose: Nose normal.   Eyes:      Conjunctiva/sclera: Conjunctivae normal.      Pupils: Pupils are equal, round, and reactive to light.   Cardiovascular:      Rate and Rhythm: Normal rate and regular rhythm.      Heart sounds: Normal heart sounds. No murmur heard.    No friction rub. No gallop.   Pulmonary:      Effort: Pulmonary effort is normal. No respiratory distress.     "  Breath sounds: Normal breath sounds. No wheezing or rales.   Chest:      Chest wall: No tenderness.   Abdominal:      General: There is no distension.      Palpations: Abdomen is soft.      Tenderness: There is no abdominal tenderness.   Musculoskeletal:         General: Normal range of motion.      Cervical back: Normal range of motion and neck supple.   Lymphadenopathy:      Cervical: No cervical adenopathy.   Skin:     General: Skin is warm and dry.      Capillary Refill: Capillary refill takes less than 2 seconds.      Findings: No rash.   Neurological:      Mental Status: She is alert and oriented to person, place, and time.      Motor: No weakness.      Coordination: Coordination normal.      Gait: Gait normal.   Psychiatric:         Mood and Affect: Mood normal.         Behavior: Behavior normal.         Thought Content: Thought content normal.         Judgment: Judgment normal.     Lab Visit on 02/27/2023   Component Date Value Ref Range Status    Hemoglobin A1C 02/27/2023 6.1 (H)  4.0 - 5.6 % Final    Comment: ADA Screening Guidelines:  5.7-6.4%  Consistent with prediabetes  >or=6.5%  Consistent with diabetes    High levels of fetal hemoglobin interfere with the HbA1C  assay. Heterozygous hemoglobin variants (HbS, HgC, etc)do  not significantly interfere with this assay.   However, presence of multiple variants may affect accuracy.      Estimated Avg Glucose 02/27/2023 128  68 - 131 mg/dL Final    Glucose, Fasting 02/27/2023 108  70 - 110 mg/dL Final       Assessment:     1. Primary hypertension    2. Prediabetes    3. Hypercholesteremia    4. Class 3 severe obesity due to excess calories with serious comorbidity and body mass index (BMI) of 45.0 to 49.9 in adult    5. Morbid obesity    6. Generalized anxiety disorder    7. Moderate episode of recurrent major depressive disorder      Plan:   Primary hypertension  -stable and controlled on tribenzor    Prediabetes  -stable and controlled with  diet    Hypercholesteremia  -doing well on lipitor  -scheduled for recheck in December.     Morbid obesity  Class 3 severe obesity due to excess calories with serious comorbidity and body mass index (BMI) of 45.0 to 49.9 in adult  -     Ambulatory referral/consult to [facility]; Future; Expected date: 03/10/2023    General anxiety disorder  Moderate episode of recurrent major depressive disorder  -up to date with pscy. Stable on current medications.     Follow up in about 6 months (around 9/3/2023), or if symptoms worsen or fail to improve.

## 2023-03-06 ENCOUNTER — PATIENT MESSAGE (OUTPATIENT)
Dept: PRIMARY CARE CLINIC | Facility: CLINIC | Age: 68
End: 2023-03-06
Payer: MEDICARE

## 2023-03-08 ENCOUNTER — HOSPITAL ENCOUNTER (OUTPATIENT)
Dept: RADIOLOGY | Facility: HOSPITAL | Age: 68
Discharge: HOME | End: 2023-03-08
Attending: SURGERY
Payer: MEDICARE

## 2023-03-08 VITALS — WEIGHT: 249.13 LBS | BODY MASS INDEX: 45.84 KG/M2 | HEIGHT: 62 IN

## 2023-03-08 DIAGNOSIS — N64.4 BREAST PAIN, LEFT: ICD-10-CM

## 2023-03-08 PROCEDURE — 76642 ULTRASOUND BREAST LIMITED: CPT | Mod: 26,LT,, | Performed by: RADIOLOGY

## 2023-03-08 PROCEDURE — 77062 MAMMO DIGITAL DIAGNOSTIC BILAT WITH TOMO: ICD-10-PCS | Mod: 26,,, | Performed by: RADIOLOGY

## 2023-03-08 PROCEDURE — 76642 US BREAST LEFT LIMITED: ICD-10-PCS | Mod: 26,LT,, | Performed by: RADIOLOGY

## 2023-03-08 PROCEDURE — 77066 DX MAMMO INCL CAD BI: CPT | Mod: 26,,, | Performed by: RADIOLOGY

## 2023-03-08 PROCEDURE — 77066 MAMMO DIGITAL DIAGNOSTIC BILAT WITH TOMO: ICD-10-PCS | Mod: 26,,, | Performed by: RADIOLOGY

## 2023-03-08 PROCEDURE — 77066 DX MAMMO INCL CAD BI: CPT | Mod: TC

## 2023-03-08 PROCEDURE — 76642 ULTRASOUND BREAST LIMITED: CPT | Mod: TC,LT

## 2023-03-08 PROCEDURE — 77062 BREAST TOMOSYNTHESIS BI: CPT | Mod: 26,,, | Performed by: RADIOLOGY

## 2023-03-09 ENCOUNTER — TELEPHONE (OUTPATIENT)
Dept: SURGERY | Facility: CLINIC | Age: 68
End: 2023-03-09
Payer: MEDICARE

## 2023-03-17 ENCOUNTER — TELEPHONE (OUTPATIENT)
Dept: INTERNAL MEDICINE | Facility: CLINIC | Age: 68
End: 2023-03-17
Payer: MEDICARE

## 2023-03-17 DIAGNOSIS — K04.7 DENTAL INFECTION: Primary | ICD-10-CM

## 2023-03-17 NOTE — TELEPHONE ENCOUNTER
----- Message from Syd Cosme sent at 3/17/2023 10:02 AM CDT -----  Contact: Patient  Type:  Needs Medical Advice    Who Called: Kelsey Terrell   Symptoms (please be specific): infected tooth    How long has patient had these symptoms:  pt states she visited the dentist on yesterday, and was told the tooth was infected.   Pharmacy name and phone #:     [id number] - [place] - [address]  [address] [address]  Phone: [phone] Fax: [phone]   Would the patient rather a call back or a response via [facility]? Call back   Best Call Back Number:  [phone]  Additional Information:  pt would like to know if she can be prescribed an antibiotic.

## 2023-03-21 RX ORDER — AMOXICILLIN AND CLAVULANATE POTASSIUM 875; 125 MG/1; MG/1
1 TABLET, FILM COATED ORAL 2 TIMES DAILY
Qty: 20 TABLET | Refills: 0 | Status: SHIPPED | OUTPATIENT
Start: 2023-03-21 | End: 2023-03-31

## 2023-04-03 ENCOUNTER — LAB VISIT (OUTPATIENT)
Dept: LAB | Facility: HOSPITAL | Age: 68
End: 2023-04-03
Attending: SURGERY
Payer: MEDICARE

## 2023-04-03 ENCOUNTER — OFFICE VISIT (OUTPATIENT)
Dept: GENETICS | Facility: CLINIC | Age: 68
End: 2023-04-03
Payer: MEDICARE

## 2023-04-03 DIAGNOSIS — Z84.81 FAMILY HISTORY OF BREAST CANCER GENE MUTATION IN FIRST DEGREE RELATIVE: ICD-10-CM

## 2023-04-03 DIAGNOSIS — Z91.89 AT HIGH RISK FOR BREAST CANCER: ICD-10-CM

## 2023-04-03 DIAGNOSIS — Z80.8 FAMILY HISTORY OF THYROID CANCER: ICD-10-CM

## 2023-04-03 DIAGNOSIS — Z84.81 FAMILY HISTORY OF BREAST CANCER GENE MUTATION IN FIRST DEGREE RELATIVE: Primary | ICD-10-CM

## 2023-04-03 PROCEDURE — 96040 PR GENETIC COUNSELING, EACH 30 MIN: ICD-10-PCS | Mod: ,,,

## 2023-04-03 PROCEDURE — 99999 PR PBB SHADOW E&M-EST. PATIENT-LVL I: ICD-10-PCS | Mod: PBBFAC,,,

## 2023-04-03 PROCEDURE — 99999 PR PBB SHADOW E&M-EST. PATIENT-LVL I: CPT | Mod: PBBFAC,,,

## 2023-04-03 PROCEDURE — 99499 NO LOS: ICD-10-PCS | Mod: S$PBB,,,

## 2023-04-03 PROCEDURE — 99211 OFF/OP EST MAY X REQ PHY/QHP: CPT | Mod: PBBFAC

## 2023-04-03 PROCEDURE — 96040 PR GENETIC COUNSELING, EACH 30 MIN: CPT | Mod: ,,,

## 2023-04-03 PROCEDURE — 36415 COLL VENOUS BLD VENIPUNCTURE: CPT | Performed by: SURGERY

## 2023-04-03 PROCEDURE — 99499 UNLISTED E&M SERVICE: CPT | Mod: S$PBB,,,

## 2023-04-03 NOTE — PROGRESS NOTES
"  Cancer Genetics  Hereditary/High Risk Clinic  Department of Hematology and Oncology  [facility]        Date of Service:  2023  Provider:  Quinn Cruz MS, [facility] – [place], [place]    Patient Information  Name:  Kelsey Terrell  :  1955  MRN:  [id number]        Referring Provider: Helen Wright MD    The chief complaint leading to consultation is: as below.  Visit type: in-person.  Face-to-face time with patient: approximately 40 minutes.  Approximately 50 minutes in total were spent on this encounter, which includes face-to-face time and non-face-to-face time preparing to see the patient (e.g., review of tests), obtaining and/or reviewing separately obtained history, documenting clinical information in the electronic or other health record, independently interpreting results (not separately reported) and communicating results to the patient/family/caregiver, or care coordination (not separately reported).      SUBJECTIVE:      Chief Complaint: Genetic Counseling    History of Present Illness (HPI):  Kelsey Terrell ("Kelsey"), 67 y.o., assigned female sex at birth is new to the [facility] Department of Hematology and Oncology and to me.  She was referred by  Breast Surgery  for genetic cancer risk assessment given her family her risk for breast cancer. Her daughter tested positive for a PTEN mutation but the family has not been able to retrieve a copy of the genetic test results from [facility]. Kelsey has no personal history of cancer.    Focused Medical History:   Germline cancer-genetic testing:  No  Cancer:  No  Colon polyp:  No  Most recent colonoscopy: , told to repeat in 10 years  Most recent mammo: 2023  Other benign tumor:  Yes  Left breast lump (~age 20)  Pancreatitis:  No  Pancreatic cyst:  No     Focused Surgical History  Left breast lumpectomy (~age 20)  JEANETTE MANUEL ()    Ancestry:  Ashkenazi Congregational ancestry:  No  Paternal:    Maternal:     Focused Family " "History:  Consanguinity in ancestors:  No  Germline cancer-genetic testing in blood relatives:  Yes   Daughter reportedly had a PTEN mutation but passed away before she can receive a copy of the test results. Family members were told to "have genetic testing for the PTEN gene", per Kelsey.  Cancer in family:  Yes; there are no known blood relatives affected with cancer other than those mentioned in the pedigree below    Family Cancer Pedigree:         Review of Systems:  See HPI.      SUBJECTIVE:   Records Reviewed  Medical History  Problem List  Any pertinent, available Procedures and Pathology reports in both [facility] and [facility]    COUNSELING   Causes of cancer  Germline cancer genetic testing is the testing of genes associated with cancer, known as cancer susceptibility genes.  Just as these genes are inherited from parents, mutations in these genes can be inherited, as well.  A mutation in a cancer susceptibility gene adversely affects the gene's ability to prevent cancer; therefore, carriers of cancer susceptibility gene mutations may be at increased risk for certain cancers.     Only 5-10% cancers are caused by a cancer susceptibility gene mutation, meaning the cancer is genetic/hereditary; rather, most cancers are sporadic.  Causes of sporadic cancers may include environmental risk factors, lifestyle risk factors, and non-modifiable risk factors.  It is important to note that members of a family often share not only their genetics but also risk factors including environmental and lifestyle risk factors, so cancers can be familial.    Discussed that mutations in PTEN is associated with an increased risk for breast, thyroid, endometrial, and kidney cancer cancer as well as other cancers. Mutations in the PTEN gene are also associated with non-cancerous findings. Discussed that mutations in other genes may increase the risk of thyroid cancer, in addition to other cancers.     Potential " results of genetic testing, and their implications  Potential results of genetic testing include positive, negative, and variant of unknown significance (VUS).    A positive result indicates the presence of at least one clinically significant mutation, and the patient's associated cancer risks vary depending upon the cancer susceptibility gene(s) in which there is/are a mutation(s).  With a positive result, in some cases, depending upon the specific result and the patient's clinical history, modified risk management may be recommended, including measures for risk reduction and/or surveillance; however, modified management is not always an option.    A negative result indicates that no clinically significant mutations were identified in the gene(s) tested.    A VUS indicates that there is not presently enough data for the laboratory to make a determination as to whether the variant is clinically significant.  VUSs are not typically acted upon clinically.       The ability to interpret the meaning of a negative genetic testing result in genes associated with cancer with which the patient has not personally been affected, when done prior to testing the appropriate affected relative(s), is significantly limited.  A negative result in the patient does not indicate that she cannot develop cancer, and, in fact, the patient may even be at increased risk for cancer based on shared risk factors with affected relatives.  The most informative candidates for initial genetic testing in a family are those who have been affected with cancer.     Modified management may also be recommended, even with a result of no or unknown significance, based upon risk assessment that incorporates the family history.      Genetic mutation inheritance  If Kelsey tests positive for a mutation, her first-degree relatives would each have a 50% chance of having the same mutation, and other, more distantly related blood-relatives would also be at risk  of having the same mutation.       Genetic discrimination  The Genetic Information Nondiscrimination Act (RORY) is U.S. federal legislation that provides some protections against use of an individual's genetic information by their health insurer and by their employer.  Title I of RORY prohibits most health insurers from utilizing an individual's genetic information to make decisions regarding insurance eligibility or premium charges.  Title II of RORY prohibits covered entities, including employers, from requesting the genetic information of employees and applicants.  RORY does not protect individuals from genetic discrimination toward health insurance obtained through a job with the  or through the Federal Employees Health Benefits Plan; from genetic discrimination by employers with fewer than 15 employees or if employed by the ; or from genetic discrimination by any other type of policies/entities, including but not limited to life insurance, disability insurance, long-term care insurance,  benefits, and [facility] benefits.     Genetic testing logistics  An outside laboratory would perform the testing after a blood sample is collected here at [facility] or a saliva sample is collected by the patient at home.  With genetic testing, there is a potential for the patient to incur out-of-pocket costs.  Results can take 2-3 weeks.  Post-test genetic counseling can be conducted once the genetic testing results are available.     Assessment/Plan  Based on the information provided by Kelsey, she meets current criteria for genetic testing of here according to current clinical guidelines. Kelsey's clinical history, including personal history and/or family history, is strongly suggestive of a hereditary cancer syndrome in the family given the family history of a PTEN mutation in her daughter.     Offered germline cancer-genetic testing at this time versus deferring testing at this time or  declining testing altogether.  Various test panel options were discussed. Kelsey decided to proceed with genetic testing through the 2-gene BRCA1/BRCA2 Core Panel and 47-gene [facility] Common Hereditary Cancer Panel (APC, AQUILES, AXIN2, BARD1, BMPR1A, BRCA1, BRCA2, BRIP1, CDH1, CDK4, CDKN2A, CHEK2, CTNNA1, DICER1, EPCAM, GREM1, HOXB13, KIT, MEN1, MLH1, MSH2, MSH3, MSH6, MUTYH, NBN, NF1, NTHL1, PALB2, PDGFRA, PMS2, POLD1, POLE, PTEN, RAD50, RAD51C, RAD51D, SDHA, SDHB, SDHC, SDHD, SMAD4, SMARCA4, STK11, TP53, TSC1, TSC2, VHL).  Kelsey has provided her informed consent to proceed. A blood sample was collected today     Questions were encouraged and answered to the patient's satisfaction, and she verbalized understanding of information and agreement with the plan.         Signed,    Quinn Cruz MS, [facility]  Certified Genetic Counselor, Hereditary and High-Risk Clinic  Hematology/Oncology, [facility]    04/03/2023

## 2023-04-10 ENCOUNTER — PATIENT MESSAGE (OUTPATIENT)
Dept: ADMINISTRATIVE | Facility: OTHER | Age: 68
End: 2023-04-10
Payer: MEDICARE

## 2023-05-03 ENCOUNTER — PATIENT MESSAGE (OUTPATIENT)
Dept: GENETICS | Facility: CLINIC | Age: 68
End: 2023-05-03
Payer: MEDICARE

## 2023-08-25 PROBLEM — N64.4 MASTALGIA IN FEMALE: Status: ACTIVE | Noted: 2023-08-25

## 2023-08-25 NOTE — PROGRESS NOTES
Breast Surgical Oncology  [place]    Date of Service: 2023    SUBJECTIVE:   Chief complaint: breast pain    HISTORY OF PRESENT ILLNESS:   Kelsey Terrell is a 68 y.o. female who is kindly referred by Dr. Harris Scott for left breast pain.    23  She reports a 1 year history of left breast pain which is intermittent in nature.  The pain is focused in the upper outer quadrant and axilla.  She does have a history of an excisional biopsy in her late teens at [facility] for benign pathology. She denies breast concerns such as pain, masses, skin changes, nipple discharge, nipple retraction or lumps under the arm.  She does not wear supportive bra off and does drink 1 cup of caffeine per day.    She has a family history significant for 2 daughters with PTEN pathogenic gene mutations.  Her daughters genetic testing was performed at [facility] and believed to have an inherited from her maternal lineage.  The patient has never had formal genetic counseling or testing.  She is not up-to-date with breast cancer screening.  Her last screening mammogram was in .    23  Diagnostic imaging following the last visit showed normal appearing enlarged lymph nodes in the site with discomfort. This was deemed BIRADS 2. She additionally had genetic testing which was negative for a pathogenic mutation. Her UOQbreast pain has not resolved. She switched to decaffeinated coffee, supplements her vitamin D, and occasionally wears supportive bras.    Her breast cancer risk factor profile is as follows: Menarche at 12, Menopause at 47.  She is . Age at first live birth was 17. Family history of cancer is as follows: father with head/neck cancer at age 49,  at age 50; daughter with parathyroid cancer (PTEN associated) at age 40,  at age 49.    FAMILY HISTORY:     Family History   Problem Relation Age of Onset    Cataracts Mother     Hypertension Father     Throat cancer Father     Other Daughter          ganglion cyst    Other Daughter         lump under armpit, removed    Thyroid cancer Daughter 40        parathyroid    Clotting disorder Daughter     Endometriosis Other     Liver disease Other     Cervical cancer Paternal Aunt     Glaucoma Neg Hx     Macular degeneration Neg Hx     Retinal detachment Neg Hx     Strabismus Neg Hx         PAST MEDICAL HISTORY:     Past Medical History:   Diagnosis Date    Chronic bronchitis     Chronic depression     dr montenegro    Difficult intubation     History of Bell's palsy 1980's    Left side    Hypercholesteremia     Hypertension     dr roland also    Mitral regurgitation     Morbid obesity     Prediabetes        SURGICAL HISTORY:     Past Surgical History:   Procedure Laterality Date    BREAST BIOPSY Left     benign-pt was 20 yrs old    closed reduction shoulder dislocation Left     HYSTERECTOMY      noncancer    OOPHORECTOMY      TONSILLECTOMY      TUBAL LIGATION         SOCIAL HISTORY:     Social History     Tobacco Use    Smoking status: Never    Smokeless tobacco: Never   Substance Use Topics    Alcohol use: No    Drug use: No        MEDICATIONS/ALLERGIES:     Current Outpatient Medications:     aspirin (ECOTRIN) 81 MG EC tablet, Take 81 mg by mouth once daily. 1 Tablet, Delayed Release (E.C.) Oral Every day, Disp: , Rfl:     atorvastatin (LIPITOR) 40 MG tablet, Take 1 tablet (40 mg total) by mouth once daily., Disp: 90 tablet, Rfl: 3    ergocalciferol (ERGOCALCIFEROL) 50,000 unit Cap, Take 50,000 Units by mouth every 7 days., Disp: , Rfl:     LORazepam (ATIVAN) 0.5 MG tablet, Take 1 tablet by mouth 3 (three) times daily., Disp: , Rfl:     TRIBENZOR 40-10-12.5 mg Tab, Take 1 tablet by mouth once daily., Disp: , Rfl:     venlafaxine (EFFEXOR-XR) 150 MG Cp24, Take by mouth once daily. , Disp: , Rfl:     venlafaxine (EFFEXOR-XR) 75 MG 24 hr capsule, Take 75 mg by mouth., Disp: , Rfl:   Review of patient's allergies indicates:  No Known Allergies    REVIEW OF SYSTEMS:   I  have reviewed 12 systems, including 2 points per system. Pertinent reported positives are:  Depressed mood, anxiety    PHYSICAL EXAM:   General: The patient appears well and is in no acute distress.     Chaperon present for examination.   BREAST EXAM  No Asymmetry  Right:  - Mass: No  - Skin change: No  - Nipple Discharge: No  - Nipple retraction: No  - Axillary LAD: No  Left:   - Mass: No  - Skin change: No, incision noted in upper outer quadrant  - Nipple Discharge: No  - Nipple retraction: No  - Axillary LAD: No    IMAGING:   Results for orders placed during the hospital encounter of 03/08/23    Mammo Digital Diagnostic Bilat with Jose    Narrative  Result:  Mammo Digital Diagnostic Bilat with Jose  US Breast Left Limited    History:  Patient is 67 y.o. and is seen for diagnostic imaging.    Films Compared:  Compared to: 03/19/2019 Mammo Digital Screening Bilat and 12/28/2015 Mammo Digital Screening Bilat with CAD    Findings:  This procedure was performed using tomosynthesis. Computer-aided detection was utilized in the interpretation of this examination.  The breasts have scattered areas of fibroglandular density.    Mammo Digital Diagnostic Bilat with Jose  Left  There is no evidence of suspicious masses, calcifications, or other abnormal findings in the left breast.    Right  There is no evidence of suspicious masses, calcifications, or other abnormal findings in the right breast.    US Breast Left Limited  Left  Lymph Node: There is a 21 mm x 14 mm x 23 mm lymph node seen in the left axilla. The lymph node correlates with pain reported by the patient. The lymph node appears morphologically normal with a prominent fatty angela and homogeneously thin cortex.    Impression  Bilateral  Mammo Digital Diagnostic Bilat with Jose  There is no mammographic evidence of malignancy.    Left  US Breast Left Limited  There is no sonographic evidence of malignancy.    BI-RADS Category:  Overall: 2 -  Benign    Recommendation:  Routine screening mammogram in 1 year is recommended.      Your estimated lifetime risk of breast cancer (to age 85) based on Tyrer-Cuzick risk assessment model is 4.03 %.  According to the American Cancer Society, patients with a lifetime breast cancer risk of 20% or higher might benefit from supplemental screening tests. ??      PATHOLOGY:   none    ASSESSMENT:     1. Mastalgia in female         PLAN:     We have reviewed that breast pain is overwhelmingly benign.  There is no national guideline that mandates routine mammography for the evaluation of generalized  cyclical breast pain.  I recommend continuing to reduce caffeine intake and vitamin D supplementation can improve the symptom of pain.  She will try evening primrose oil to see if it will provide some relief.  She will return in March 2023 for re-evaluation of symptoms with her annual screening mammogram    The patient is in agreement with the plan. Questions were encouraged and answered to patient's satisfaction. Kelsey will call our office with any questions or concerns.       I spent a total of 25 minutes on this visit. This includes face to face time and non-face to face time preparing to see the patient (eg, review of tests), obtaining and/or reviewing separately obtained history, documenting clinical information in the electronic or other health record, independently interpreting results and communicating results to the patient/family/caregiver, or care coordinator.        Helen Wright M.D.

## 2023-08-28 ENCOUNTER — OFFICE VISIT (OUTPATIENT)
Dept: SURGERY | Facility: CLINIC | Age: 68
End: 2023-08-28
Payer: MEDICARE

## 2023-08-28 DIAGNOSIS — N64.4 MASTALGIA IN FEMALE: Primary | ICD-10-CM

## 2023-08-28 PROCEDURE — 99213 PR OFFICE/OUTPT VISIT, EST, LEVL III, 20-29 MIN: ICD-10-PCS | Mod: S$PBB,,, | Performed by: SURGERY

## 2023-08-28 PROCEDURE — 99213 OFFICE O/P EST LOW 20 MIN: CPT | Mod: S$PBB,,, | Performed by: SURGERY

## 2023-08-29 DIAGNOSIS — Z12.31 ENCOUNTER FOR SCREENING MAMMOGRAM FOR BREAST CANCER: Primary | ICD-10-CM

## 2023-09-01 ENCOUNTER — PATIENT MESSAGE (OUTPATIENT)
Dept: RESEARCH | Facility: CLINIC | Age: 68
End: 2023-09-01
Payer: MEDICARE

## 2023-09-05 ENCOUNTER — PATIENT MESSAGE (OUTPATIENT)
Dept: INTERNAL MEDICINE | Facility: CLINIC | Age: 68
End: 2023-09-05

## 2023-09-05 ENCOUNTER — LAB VISIT (OUTPATIENT)
Dept: LAB | Facility: HOSPITAL | Age: 68
End: 2023-09-05
Attending: PHYSICIAN ASSISTANT
Payer: MEDICARE

## 2023-09-05 ENCOUNTER — OFFICE VISIT (OUTPATIENT)
Dept: INTERNAL MEDICINE | Facility: CLINIC | Age: 68
End: 2023-09-05
Payer: MEDICARE

## 2023-09-05 VITALS
TEMPERATURE: 97 F | HEIGHT: 62 IN | DIASTOLIC BLOOD PRESSURE: 78 MMHG | WEIGHT: 259.5 LBS | SYSTOLIC BLOOD PRESSURE: 122 MMHG | BODY MASS INDEX: 47.75 KG/M2

## 2023-09-05 DIAGNOSIS — I10 PRIMARY HYPERTENSION: Primary | ICD-10-CM

## 2023-09-05 DIAGNOSIS — R73.03 PREDIABETES: ICD-10-CM

## 2023-09-05 DIAGNOSIS — F41.9 ANXIETY: ICD-10-CM

## 2023-09-05 DIAGNOSIS — E78.2 MIXED HYPERLIPIDEMIA: ICD-10-CM

## 2023-09-05 DIAGNOSIS — F33.1 MODERATE EPISODE OF RECURRENT MAJOR DEPRESSIVE DISORDER: ICD-10-CM

## 2023-09-05 DIAGNOSIS — E66.01 MORBID OBESITY: ICD-10-CM

## 2023-09-05 LAB
CHOLEST SERPL-MCNC: 169 MG/DL (ref 120–199)
CHOLEST/HDLC SERPL: 4.3 {RATIO} (ref 2–5)
ESTIMATED AVG GLUCOSE: 148 MG/DL (ref 68–131)
HBA1C MFR BLD: 6.8 % (ref 4–5.6)
HDLC SERPL-MCNC: 39 MG/DL (ref 40–75)
HDLC SERPL: 23.1 % (ref 20–50)
LDLC SERPL CALC-MCNC: 111 MG/DL (ref 63–159)
NONHDLC SERPL-MCNC: 130 MG/DL
TRIGL SERPL-MCNC: 95 MG/DL (ref 30–150)

## 2023-09-05 PROCEDURE — 83036 HEMOGLOBIN GLYCOSYLATED A1C: CPT | Performed by: PHYSICIAN ASSISTANT

## 2023-09-05 PROCEDURE — 36415 COLL VENOUS BLD VENIPUNCTURE: CPT | Performed by: PHYSICIAN ASSISTANT

## 2023-09-05 PROCEDURE — 99213 OFFICE O/P EST LOW 20 MIN: CPT | Mod: S$PBB,,, | Performed by: PHYSICIAN ASSISTANT

## 2023-09-05 PROCEDURE — 80061 LIPID PANEL: CPT | Performed by: PHYSICIAN ASSISTANT

## 2023-09-05 PROCEDURE — 99213 PR OFFICE/OUTPT VISIT, EST, LEVL III, 20-29 MIN: ICD-10-PCS | Mod: S$PBB,,, | Performed by: PHYSICIAN ASSISTANT

## 2023-09-05 PROCEDURE — 99999 PR PBB SHADOW E&M-EST. PATIENT-LVL III: ICD-10-PCS | Mod: PBBFAC,,, | Performed by: PHYSICIAN ASSISTANT

## 2023-09-05 PROCEDURE — 99213 OFFICE O/P EST LOW 20 MIN: CPT | Mod: PBBFAC | Performed by: PHYSICIAN ASSISTANT

## 2023-09-05 PROCEDURE — 99999 PR PBB SHADOW E&M-EST. PATIENT-LVL III: CPT | Mod: PBBFAC,,, | Performed by: PHYSICIAN ASSISTANT

## 2023-09-05 NOTE — PROGRESS NOTES
Subjective:      Patient ID: Kelsey Terrell is a 68 y.o. female.    Chief Complaint: Follow-up    HPI  Here today for a follow up for her medical problems.   STill having breast pain. Seeing breast specialist.   Psych: Dr. V [facility]. Up to date. Takes effexor 225mg daily. In the middle of planning to get her house raised ([place]).   Has been on lipitor for 6 months.   Also Total body supplement otc. Multivitamin.   Ativan 1mg in Am and 0.5 in PM.   B12 patch that helps with energy. OTC.     Patient Active Problem List   Diagnosis    Hypertension    Morbid obesity    Hypercholesteremia    Ulnar neuropathy    Injury of peripheral nerve plexus    HLD (hyperlipidemia)    Anxiety    Prediabetes    Moderate episode of recurrent major depressive disorder    Mastalgia in female         Current Outpatient Medications:     aspirin (ECOTRIN) 81 MG EC tablet, Take 81 mg by mouth once daily. 1 Tablet, Delayed Release (E.C.) Oral Every day, Disp: , Rfl:     atorvastatin (LIPITOR) 40 MG tablet, Take 1 tablet (40 mg total) by mouth once daily., Disp: 90 tablet, Rfl: 3    ergocalciferol (ERGOCALCIFEROL) 50,000 unit Cap, Take 50,000 Units by mouth every 7 days., Disp: , Rfl:     LORazepam (ATIVAN) 0.5 MG tablet, Take 1 tablet by mouth 3 (three) times daily., Disp: , Rfl:     TRIBENZOR 40-10-12.5 mg Tab, Take 1 tablet by mouth once daily., Disp: , Rfl:     venlafaxine (EFFEXOR-XR) 150 MG Cp24, Take by mouth once daily. , Disp: , Rfl:     venlafaxine (EFFEXOR-XR) 75 MG 24 hr capsule, Take 75 mg by mouth., Disp: , Rfl:     Review of Systems   Constitutional:  Negative for activity change, appetite change, chills, diaphoresis, fatigue, fever and unexpected weight change.   HENT: Negative.  Negative for congestion, hearing loss, postnasal drip, rhinorrhea, sore throat, trouble swallowing and voice change.    Eyes: Negative.  Negative for visual disturbance.   Respiratory: Negative.  Negative for cough, choking, chest  "tightness and shortness of breath.    Cardiovascular:  Negative for chest pain, palpitations and leg swelling.   Gastrointestinal:  Negative for abdominal distention, abdominal pain, blood in stool, constipation, diarrhea, nausea and vomiting.   Endocrine: Negative for cold intolerance, heat intolerance, polydipsia and polyuria.   Genitourinary: Negative.  Negative for difficulty urinating and frequency.   Musculoskeletal:  Negative for arthralgias, back pain, gait problem, joint swelling and myalgias.   Skin:  Negative for color change, pallor, rash and wound.   Neurological:  Negative for dizziness, tremors, weakness, light-headedness, numbness and headaches.   Hematological:  Negative for adenopathy.   Psychiatric/Behavioral:  Negative for behavioral problems, confusion, self-injury, sleep disturbance and suicidal ideas. The patient is not nervous/anxious.      Objective:   /78 (BP Location: Left arm, Patient Position: Sitting, BP Method: Large (Manual))   Temp 96.7 °F (35.9 °C) (Tympanic)   Ht 5' 2.01" (1.575 m)   Wt 117.7 kg (259 lb 7.7 oz)   BMI 47.45 kg/m²     Physical Exam  Vitals reviewed.   Constitutional:       General: She is not in acute distress.     Appearance: Normal appearance. She is well-developed. She is not ill-appearing, toxic-appearing or diaphoretic.   HENT:      Head: Normocephalic and atraumatic.      Right Ear: External ear normal.      Left Ear: External ear normal.      Nose: Nose normal.   Eyes:      Conjunctiva/sclera: Conjunctivae normal.      Pupils: Pupils are equal, round, and reactive to light.   Cardiovascular:      Rate and Rhythm: Normal rate and regular rhythm.      Heart sounds: Normal heart sounds. No murmur heard.     No friction rub. No gallop.   Pulmonary:      Effort: Pulmonary effort is normal. No respiratory distress.      Breath sounds: Normal breath sounds. No wheezing or rales.   Chest:      Chest wall: No tenderness.   Abdominal:      General: There is no " "distension.      Palpations: Abdomen is soft.      Tenderness: There is no abdominal tenderness.   Musculoskeletal:         General: Normal range of motion.      Cervical back: Normal range of motion and neck supple.   Lymphadenopathy:      Cervical: No cervical adenopathy.   Skin:     General: Skin is warm and dry.      Capillary Refill: Capillary refill takes less than 2 seconds.      Findings: No rash.   Neurological:      Mental Status: She is alert and oriented to person, place, and time.      Motor: No weakness.      Coordination: Coordination normal.      Gait: Gait normal.   Psychiatric:         Mood and Affect: Mood normal.         Behavior: Behavior normal.         Thought Content: Thought content normal.         Judgment: Judgment normal.       Lab Results   Component Value Date    HGBA1C 6.1 (H) 02/27/2023     Lab Results   Component Value Date    CHOL 252 (H) 01/03/2023    CHOL 240 (H) 02/05/2019    CHOL 234 (H) 02/20/2017     Lab Results   Component Value Date    HDL 41 01/03/2023    HDL 43 02/05/2019    HDL 30 (L) 02/20/2017     Lab Results   Component Value Date    LDLCALC 190.0 (H) 01/03/2023    LDLCALC 170.0 (H) 02/05/2019    LDLCALC 171.8 (H) 02/20/2017     No results found for: "DLDL"  Lab Results   Component Value Date    TRIG 105 01/03/2023    TRIG 135 02/05/2019    TRIG 161 (H) 02/20/2017       f1   Lab Results   Component Value Date    CHOLHDL 16.3 (L) 01/03/2023    CHOLHDL 17.9 (L) 02/05/2019    CHOLHDL 12.8 (L) 02/20/2017      Assessment:     1. Primary hypertension    2. Prediabetes    3. Mixed hyperlipidemia    4. Moderate episode of recurrent major depressive disorder    5. Anxiety    6. Morbid obesity      Plan:   Primary hypertension  -     Cancel: CBC Auto Differential; Future; Expected date: 09/05/2023  -     Cancel: Comprehensive Metabolic Panel; Future; Expected date: 09/05/2023    Prediabetes  -     Hemoglobin A1C; Future; Expected date: 09/05/2023  -     Hemoglobin A1C; Future; " Expected date: 09/05/2023    Mixed hyperlipidemia  -     Lipid Panel; Future; Expected date: 09/05/2023    Moderate episode of recurrent major depressive disorder    Anxiety    Morbid obesity    -up to date with psych  -recheck a1c and lipid today  -medical problems stable and controlled.  -recheck lipid since change in cholesterol medication to make sure in better range.     Follow up in about 6 months (around 3/5/2024), or if symptoms worsen or fail to improve.

## 2023-09-06 ENCOUNTER — DOCUMENTATION ONLY (OUTPATIENT)
Dept: RESEARCH | Facility: CLINIC | Age: 68
End: 2023-09-06
Payer: MEDICARE

## 2023-09-06 NOTE — PROGRESS NOTES
This patient meets at least one or more of the exclusion criteria for the PROPEL IT weight management study.  REASON(S) for EXCLUSION: Unwilling to change diet or physical activity.    PROPEL IT Weight Loss: Eligibility Confirmation  Remember to add Participant ID in Research Study  Age as of Today: 68 y.o.    Is patient age 40 to 70 years Yes    Is patient race Black/: Epic: Black or      Primary care provider at [facility]: Harris Scott MD     Does the patient have an active [facility] portal account?  Yes    Does the patient have prediabetes or Type 2 diabetes? Yes  If yes, Based on: Prediabetes ICD10 code    LAST HBA1C   Lab Results   Component Value Date    HGBA1C 6.8 (H) 09/05/2023    HGBA1C 6.1 (H) 02/27/2023    HGBA1C 6.6 (H) 01/03/2023          LAST BMI:   BMI Readings from Last 1 Encounters:   09/05/23 47.45 kg/m²      Was the patient's most recent BMI (within the past 12 months) between 30 and 50  yes    PCP REFERRAL  Did provider acknowledge or deny patient's participation? N/A Screen Failed  _____________________________________________  LAST WEIGHT  (verify if this was an outpatient):   Wt Readings from Last 4 Encounters:   09/05/23 117.7 kg (259 lb 7.7 oz)   03/08/23 113 kg (249 lb 1.9 oz)   03/03/23 113.9 kg (251 lb 1.7 oz)   02/23/23 113.7 kg (250 lb 10.6 oz)        PROPEL-IT Screening Date (enter from [employer]): 9-2-2023  Does the patient have a baseline clinic weight collected within 4 weeks (34 days) of Screening Date?  N/A, screen fail                    When is the patient's next scheduled clinic appointment (potential wt)?    Status Updated: 09/06/2023          PROPEL IT CONSENT DOCUMENTATION  Oncore Protocol 2022013: PROPEL IT  PROmoting Successful Weight Loss in Primary CarE in [place] (PROPEL) using Information Technology  : Rudi Garcia, PhD.  IRB of Record: [facility] ([facility]) ID# [facility] [id number]  [facility]  "Investigator: Korina Fernandes MD      Informed Consent Process   Name of Study Team member Present for discussion: N/A   Prior to the Informed Consent being signed or any protocol required testing, procedure or intervention being performed, the following was completed or discussed:   Purpose of the study, qualifications to participate:  Study design, schedule and procedure:  Risks, benefits, alternative treatments, compensation and costs:  Confidentiality and HIPAA authorization for Release of Medical Records for the research trial/subjects right/study related injury:  Study related contact information:  Voluntary participation and withdrawal from the research trial at any time:  Patient has been offered the opportunity to ask questions regarding the study    and PI contact information given to subject:  Signed copy of consent form was provided to the subject via eMail:  Original consent or copy of electronic consent in subject's chart and uploaded in EPIC:        Kelsey Terrell electronically signed the informed consent form.     Was the consent done electronically: Yes  Consent Signature Date (add to  note) ("Today's Date REDCap):  9-2-2023  The consent form as reviewed by Ellen Figueroa  Name: ([facility] personnel reviewing consent form):  Judy Lopez, Associate Clinical Research Coordinator   Is the potential subject currently enrolled in any other research trials (per Epic)? No  Participant ID (REDCap ID) added to Research Study   Yes    Comments: See  for Consent Forms        I acknowledge that I have reviewed the informed consent form and viewed the volunteer's electronically signed and dated the signature section of the consent forms.    Yes       "

## 2023-12-08 ENCOUNTER — PATIENT MESSAGE (OUTPATIENT)
Dept: ADMINISTRATIVE | Facility: OTHER | Age: 68
End: 2023-12-08
Payer: MEDICARE

## 2023-12-27 ENCOUNTER — LAB VISIT (OUTPATIENT)
Dept: LAB | Facility: HOSPITAL | Age: 68
End: 2023-12-27
Attending: FAMILY MEDICINE
Payer: MEDICARE

## 2023-12-27 DIAGNOSIS — I10 PRIMARY HYPERTENSION: ICD-10-CM

## 2023-12-27 DIAGNOSIS — R73.03 PREDIABETES: ICD-10-CM

## 2023-12-27 LAB
ALBUMIN SERPL BCP-MCNC: 3.8 G/DL (ref 3.5–5.2)
ALP SERPL-CCNC: 113 U/L (ref 55–135)
ALT SERPL W/O P-5'-P-CCNC: 31 U/L (ref 10–44)
ANION GAP SERPL CALC-SCNC: 16 MMOL/L (ref 8–16)
AST SERPL-CCNC: 26 U/L (ref 10–40)
BILIRUB SERPL-MCNC: 0.6 MG/DL (ref 0.1–1)
BUN SERPL-MCNC: 14 MG/DL (ref 8–23)
CALCIUM SERPL-MCNC: 9.5 MG/DL (ref 8.7–10.5)
CHLORIDE SERPL-SCNC: 99 MMOL/L (ref 95–110)
CHOLEST SERPL-MCNC: 172 MG/DL (ref 120–199)
CHOLEST/HDLC SERPL: 5.2 {RATIO} (ref 2–5)
CO2 SERPL-SCNC: 23 MMOL/L (ref 23–29)
CREAT SERPL-MCNC: 1.3 MG/DL (ref 0.5–1.4)
EST. GFR  (NO RACE VARIABLE): 45.4 ML/MIN/1.73 M^2
ESTIMATED AVG GLUCOSE: 258 MG/DL (ref 68–131)
GLUCOSE SERPL-MCNC: 312 MG/DL (ref 70–110)
HBA1C MFR BLD: 10.6 % (ref 4–5.6)
HDLC SERPL-MCNC: 33 MG/DL (ref 40–75)
HDLC SERPL: 19.2 % (ref 20–50)
LDLC SERPL CALC-MCNC: 108.6 MG/DL (ref 63–159)
NONHDLC SERPL-MCNC: 139 MG/DL
POTASSIUM SERPL-SCNC: 3.7 MMOL/L (ref 3.5–5.1)
PROT SERPL-MCNC: 7.4 G/DL (ref 6–8.4)
SODIUM SERPL-SCNC: 138 MMOL/L (ref 136–145)
TRIGL SERPL-MCNC: 152 MG/DL (ref 30–150)

## 2023-12-27 PROCEDURE — 80061 LIPID PANEL: CPT | Performed by: FAMILY MEDICINE

## 2023-12-27 PROCEDURE — 80053 COMPREHEN METABOLIC PANEL: CPT | Performed by: FAMILY MEDICINE

## 2023-12-27 PROCEDURE — 36415 COLL VENOUS BLD VENIPUNCTURE: CPT | Performed by: FAMILY MEDICINE

## 2023-12-27 PROCEDURE — 83036 HEMOGLOBIN GLYCOSYLATED A1C: CPT | Performed by: FAMILY MEDICINE

## 2023-12-28 NOTE — TELEPHONE ENCOUNTER
No care due was identified.  [facility] Embedded Care Due Messages. Reference number: [id number].   12/28/2023 4:29:18 PM CST

## 2023-12-29 RX ORDER — ATORVASTATIN CALCIUM 40 MG/1
40 TABLET, FILM COATED ORAL DAILY
Qty: 90 TABLET | Refills: 0 | Status: SHIPPED | OUTPATIENT
Start: 2023-12-29

## 2023-12-29 NOTE — TELEPHONE ENCOUNTER
Refill Decision Note   Kelsey Nidhi  is requesting a refill authorization.  Brief Assessment and Rationale for Refill:  Approve     Medication Therapy Plan:         Comments:     Note composed:9:17 AM 12/29/2023

## 2024-01-08 ENCOUNTER — OFFICE VISIT (OUTPATIENT)
Dept: INTERNAL MEDICINE | Facility: CLINIC | Age: 69
End: 2024-01-08
Payer: MEDICARE

## 2024-01-08 VITALS
HEART RATE: 95 BPM | OXYGEN SATURATION: 95 % | TEMPERATURE: 98 F | SYSTOLIC BLOOD PRESSURE: 138 MMHG | WEIGHT: 264.56 LBS | RESPIRATION RATE: 20 BRPM | BODY MASS INDEX: 48.68 KG/M2 | HEIGHT: 62 IN | DIASTOLIC BLOOD PRESSURE: 88 MMHG

## 2024-01-08 DIAGNOSIS — E66.01 MORBID OBESITY: ICD-10-CM

## 2024-01-08 DIAGNOSIS — I10 PRIMARY HYPERTENSION: ICD-10-CM

## 2024-01-08 DIAGNOSIS — F33.1 MODERATE EPISODE OF RECURRENT MAJOR DEPRESSIVE DISORDER: ICD-10-CM

## 2024-01-08 DIAGNOSIS — E11.9 TYPE 2 DIABETES MELLITUS WITHOUT COMPLICATION, WITHOUT LONG-TERM CURRENT USE OF INSULIN: Primary | ICD-10-CM

## 2024-01-08 PROBLEM — R73.03 PREDIABETES: Status: RESOLVED | Noted: 2023-01-04 | Resolved: 2024-01-08

## 2024-01-08 PROCEDURE — 99999 PR PBB SHADOW E&M-EST. PATIENT-LVL V: CPT | Mod: PBBFAC,,, | Performed by: FAMILY MEDICINE

## 2024-01-08 PROCEDURE — 99214 OFFICE O/P EST MOD 30 MIN: CPT | Mod: S$PBB,,, | Performed by: FAMILY MEDICINE

## 2024-01-08 PROCEDURE — 99215 OFFICE O/P EST HI 40 MIN: CPT | Mod: PBBFAC | Performed by: FAMILY MEDICINE

## 2024-01-08 RX ORDER — METFORMIN HYDROCHLORIDE 500 MG/1
1000 TABLET, EXTENDED RELEASE ORAL
Qty: 60 TABLET | Refills: 11 | Status: SHIPPED | OUTPATIENT
Start: 2024-01-08 | End: 2025-01-07

## 2024-01-08 RX ORDER — LANCETS
EACH MISCELLANEOUS
Qty: 20 EACH | Refills: 11 | Status: SHIPPED | OUTPATIENT
Start: 2024-01-08

## 2024-01-08 RX ORDER — TIRZEPATIDE 2.5 MG/.5ML
2.5 INJECTION, SOLUTION SUBCUTANEOUS
Qty: 4 PEN | Refills: 1 | Status: SHIPPED | OUTPATIENT
Start: 2024-01-08 | End: 2024-02-08 | Stop reason: DRUGHIGH

## 2024-01-08 RX ORDER — INSULIN PUMP SYRINGE, 3 ML
EACH MISCELLANEOUS
Qty: 1 EACH | Refills: 0 | Status: SHIPPED | OUTPATIENT
Start: 2024-01-08 | End: 2025-01-07

## 2024-01-08 NOTE — PATIENT INSTRUCTIONS
Please obtain the RSV vaccine via your pharmacy  Metformin -begin once daily 3 days then two daily  Check sugar a couple times a week before breakfast

## 2024-01-09 ENCOUNTER — TELEPHONE (OUTPATIENT)
Dept: DIABETES | Facility: CLINIC | Age: 69
End: 2024-01-09

## 2024-01-11 ENCOUNTER — TELEPHONE (OUTPATIENT)
Dept: INTERNAL MEDICINE | Facility: CLINIC | Age: 69
End: 2024-01-11
Payer: MEDICARE

## 2024-01-11 NOTE — TELEPHONE ENCOUNTER
Spoke with pt. States diagnosis code needed for Mounjaro per Pharmacist. Spoke with Hector-Pharmacist. Dx code E11.9 given. Code accepted. Per pharmacist medication will be filled and pt can  today. Notified pt medication will be ready for  today. Verbalized understanding.//ddw

## 2024-01-11 NOTE — TELEPHONE ENCOUNTER
----- Message from Sterling Saunders sent at 1/11/2024  1:05 PM CST -----  Regarding: pt meds  Name of Who is Calling:Pt         What is the request in detail: Pt requesting prior authorization for Mounjaro script please advise     [id number] - [place] - [address]  [id number] [id number]  Phone: [phone] Fax: [phone]              Can the clinic reply by [facility]: no         What Number [facility] Call Back [facility]:Telephone Information:  Mobile          [phone]

## 2024-01-19 ENCOUNTER — TELEPHONE (OUTPATIENT)
Dept: INTERNAL MEDICINE | Facility: CLINIC | Age: 69
End: 2024-01-19
Payer: MEDICARE

## 2024-01-24 ENCOUNTER — TELEPHONE (OUTPATIENT)
Dept: INTERNAL MEDICINE | Facility: CLINIC | Age: 69
End: 2024-01-24
Payer: MEDICARE

## 2024-01-24 NOTE — TELEPHONE ENCOUNTER
Approval Details    Authorized from December 10, 2023 to January 8, 2025   Information received electronically from payer      History    View all authorizations for this medication   Approved   1/9/2024 10:51 AM  Case ID: [id number] Sending pharmacy: [id number]  [place] - [address]  Appeal supported: No  Note from payer: Your PA request has been approved. Additional information will be provided in the approval communication. (Message 1145)   Payer: [employer] FEP    [phone]     Notes     Time User Attachment    Attachment received from payer. 1/9/2024 10:51 AM Interface, Epa Covermymeds Incoming Document          Waiting for Payer Response   1/9/2024  9:55 AM  Case ID: [id number] Reply deadline: January 16, 2024 2:57 AM Sending user: Henri Jade MA   Payer: [facility] Employee Program FEP    [phone]   [employer] Electronic PA Form (2017 NCPDP)   [employer] Prior Authorizations      Waiting for Payer Response   1/9/2024  8:57 AM  Case ID: [id number] Reply deadline: January 8, 2025 5:49 PM Sending user: Henri Jade MA   Payer: [facility] Employee Program FEP    [phone]   [employer] Electronic PA Form (2017 NCPDP)   [employer] Prior Authorizations      Waiting for Payer Response   1/8/2024  5:49 PM  Sending user: Interface, Refill Requests Covermymeds Incoming   Payer: [employer] Patient's Payer    [phone]      Ashley Approved

## 2024-01-29 ENCOUNTER — CLINICAL SUPPORT (OUTPATIENT)
Dept: DIABETES | Facility: CLINIC | Age: 69
End: 2024-01-29
Payer: MEDICARE

## 2024-01-29 VITALS — BODY MASS INDEX: 48.02 KG/M2 | WEIGHT: 262.56 LBS

## 2024-01-29 DIAGNOSIS — E11.9 TYPE 2 DIABETES MELLITUS WITHOUT COMPLICATION, WITHOUT LONG-TERM CURRENT USE OF INSULIN: ICD-10-CM

## 2024-01-29 PROCEDURE — 99999 PR PBB SHADOW E&M-EST. PATIENT-LVL III: CPT | Mod: PBBFAC,,,

## 2024-01-29 PROCEDURE — G0108 DIAB MANAGE TRN  PER INDIV: HCPCS | Mod: PBBFAC

## 2024-01-29 PROCEDURE — 99213 OFFICE O/P EST LOW 20 MIN: CPT | Mod: PBBFAC

## 2024-01-29 PROCEDURE — 99999PBSHW PR PBB SHADOW TECHNICAL ONLY FILED TO HB: Mod: PBBFAC,,,

## 2024-01-29 NOTE — PROGRESS NOTES
Diabetes Care Specialist Progress Note  Author: Brigida Garner RN  Date: 1/29/2024    Program Intake  Reason for Diabetes Program Visit:: Initial Diabetes Assessment  Current diabetes risk level:: high  In the last 12 months, have you:: none  Permission to speak with others about care:: no    Lab Results   Component Value Date    HGBA1C 10.6 (H) 12/27/2023     CURRENT MEDS  Mounjaro 2.5 mg weekly (Saturdays)  Metformin XR 1,000 mg daily    Clinical    Weight: 119.1 kg (262 lb 9.1 oz)       Body mass index is 48.02 kg/m².    Problem Review  Reviewed Problem List with Patient: yes  Active comorbidities affecting diabetes self-care.: yes  Comorbidities: Cardiovascular Disease, Hypertension, Other (comment) (Major Depressive Disorder)  Reviewed health maintenance: yes    Clinical Assessment  Current Diabetes Treatment: Oral Medication, Injectable  Have you ever experienced hypoglycemia (low blood sugar)?:  (Has not started checking.)  Have you ever experienced hyperglycemia (high blood sugar)?:  (Has not started checking.)    Medication Information  How do you obtain your medications?: Patient drives  How many days a week do you miss your medications?: Never  Do you sometimes have difficulty refilling your medications?: No  Medication adherence impacting ability to self-manage diabetes?: No    Labs  Do you have regular lab work to monitor your medications?: Yes  Type of Regular Lab Work: A1c, Cholesterol, Microalbumin, CBC, BMP  Where do you get your labs drawn?: [facility]  Lab Compliance Barriers: No    Nutritional Status  Diet: Regular  Meal Plan 24 Hour Recall: Breakfast, Lunch, Dinner, Snack  Meal Plan 24 Hour Recall - Breakfast: None. (Usually 2 eggs. This AM: Cucumber & tomatoes with Romanian dressing)  Meal Plan 24 Hour Recall - Lunch: None.  Meal Plan 24 Hour Recall - Dinner: Spinach & baked sweet potato; SF drink  Meal Plan 24 Hour Recall - Snack: None. (Usually nuts)  Change in appetite?: Yes (Decreased since  starting Mounjaro.)  Dentation:: Wears Dentures  Recent Changes in Weight: No Recent Weight Change  Current nutritional status an area of need that is impacting patient's ability to self-manage diabetes?: No    Additional Social History    Support  Does anyone support you with your diabetes care?: yes  Who supports you?: son/daughter, spouse, self  Who takes you to your medical appointments?: self  Does the current support meet the patient's needs?: Yes  Is Support an area impacting ability to self-manage diabetes?: No    Access to Mass Media & Technology  Does the patient have access to any of the following devices or technologies?: Smart phone  Media or technology needs impacting ability to self-manage diabetes?: No    Cognitive/Behavioral Health  Alert and Oriented: Yes  Difficulty Thinking: No  Requires Prompting: No  Requires assistance for routine expression?: No  Cognitive or behavioral barriers impacting ability to self-manage diabetes?: No    Culture/Rastafari  Culture or Latter day beliefs that may impact ability to access healthcare: No    Communication  Language preference: English  Hearing Problems: No  Vision Problems: Yes  Vision problem type:: Decreased Vision  Vision Assistance: Glasses  Communication needs impacting ability to self-manage diabetes?: No    Health Literacy  Preferred Learning Method: Face to Face, Reading Materials, Demonstration (Brought own DM book)  How often do you need to have someone help you read instructions, pamphlets, or written material from your doctor or pharmacy?: Never  Health literacy needs impacting ability to self-manage diabetes?: No      Diabetes Self-Management Skills Assessment    Diabetes Disease Process/Treatment Options  Patient/caregiver able to state what happens when someone has diabetes.: somewhat  Patient/caregiver knows what type of diabetes they have.: yes  Diabetes Type : Type II  Patient/caregiver able to identify at least three signs and symptoms of  diabetes.: yes  Identified signs and symptoms:: blurred vision, frequent urination, increased thirst  Patient able to identify at least three risk factors for diabetes.: no  Diabetes Disease Process/Treatment Options: Skills Assessment Completed: Yes  Assessment indicates:: Knowledge deficit  Area of need?: Yes    Nutrition/Healthy Eating  Challenges to healthy eating:: portion control, lack of will power (Loves pastas.)  Method of carbohydrate measurement:: carb counting/reading labels (Looks at protein, carbs, sugar, & fiber)  Patient can identify foods that impact blood sugar.: yes  Patient-identified foods:: starches (bread, pasta, rice, cereal), starchy vegetables (corn, peas, beans)  Nutrition/Healthy Eating Skills Assessment Completed:: Yes  Assessment indicates:: Knowledge deficit, Instruction Needed  Area of need?: Yes    Physical Activity/Exercise  Patient's daily activity level:: sedentary  Patient formally exercises outside of work.: no  Reasons for not exercising:: other (see comments) (Sleep schedule. Knee pain.)  Patient can identify forms of physical activity.: yes  Stated forms of physical activity:: seated/chair exercises, other (see comments) (Stationary peddling.)  Patient can identify reasons why exercise/physical activity is important in diabetes management.: no  Physical Activity/Exercise Skills Assessment Completed: : Yes  Assessment indicates:: Knowledge deficit, Instruction Needed  Area of need?: Yes    Medications  Patient is able to describe current diabetes management routine.: yes  Diabetes management routine:: injectable medications, oral medications  Patient is able to identify current diabetes medications, dosages, and appropriate timing of medications.: yes  Patient understands the purpose of the medications taken for diabetes.: no  Patient reports problems or concerns with current medication regimen.: no  Medication Skills Assessment Completed:: Yes  Assessment indicates:: Adequate  understanding  Area of need?: No    Home Blood Glucose Monitoring  Patient states that blood sugar is checked at home daily.: no  Reasons for not monitoring:: new diabetes diagnosis, needs training  Home Blood Glucose Monitoring Skills Assessment Completed: : Yes  Assessment indicates:: Knowledge deficit, Instruction Needed  Area of need?: Yes    Acute Complications  Patient is able to identify types of acute complications: Yes  Patient Identified:: Hyperglycemia  Patient is able to state the basic meaning of hyperglycemia?: No  Able to state the blood sugar range for hyperglycemia?: no (see comments)  Patient able to state proper treatment of hyperglycemia?: yes  Patient identified:: increase water intake  Patient able to verbalize sick day plan?: no  Acute Complications Skills Assessment Completed: : Yes  Assessment indicates:: Knowledge deficit  Area of need?: Yes    Chronic Complications  Chronic Complications Skills Assessment Completed: : No  Deferred due to:: Time  Area of need?: Deferred    Psychosocial/Coping  Patient can identify ways of coping with chronic disease.: yes  Patient-stated ways of coping with chronic disease:: support from loved ones, other (see comments) (Educating herself on DM.)  Psychosocial/Coping Skills Assessment Completed: : Yes  Assessment indicates:: Adequate understanding  Area of need?: No      Assessment Summary and Plan    Based on today's diabetes care assessment, the following areas of need were identified:          1/29/2024    12:01 AM   Social   Support No   Access to Mass Media/Tech No   Cognitive/Behavioral Health No   Culture/Rastafarian No   Communication No   Health Literacy No            1/29/2024    12:01 AM   Clinical   Medication Adherence No   Lab Compliance No   Nutritional Status No            1/29/2024    12:01 AM   Diabetes Self-Management Skills   Diabetes Disease Process/Treatment Options Yes-Reviewed pathophysiology of type 2 diabetes, risk factors, and  treatment options.     Nutrition/Healthy Eating Yes-See care plan.     Physical Activity/Exercise Yes-See care plan.     Medication No-Take medications appropriately. Discussed MOA of Metformin; encouraged limiting fatty foods to decrease stomach upset.      Home Blood Glucose Monitoring Yes-See care plan.     Acute Complications Yes-Discussed hyperglycemia.      Chronic Complications Deferred-Knows she needs to establish with eye doctor for dilated eye exam and podiatrist for foot exam.      Psychosocial/Coping No-Daughter is great support for her.            Today's interventions were provided through individual discussion, instruction, and written materials were provided.      Patient verbalized understanding of instruction and written materials.  Pt was able to return back demonstration of instructions today. Patient understood key points, needs reinforcement and further instruction.     Diabetes Self-Management Care Plan:    Today's Diabetes Self-Management Care Plan was developed with Kelsey's input. Kelsey has agreed to work toward the following goal(s) to improve his/her overall diabetes control.      Care Plan: Diabetes Management   Updates made since 12/30/2023 12:00 AM        Problem: Healthy Eating         Goal: Eat 2-3 meals daily with 30-45 g of carbs per meal or use the My Plate method.    Start Date: 1/29/2024   Expected End Date: 1/29/2025   Priority: High   Barriers: No Barriers Identified   Note:     usually does the grocery shopping, cooking, and making plates.  Eating schedule is sporadic because she sleeps during the day. Appetite has been decreased since starting Mounjaro but does try to make herself eat something.   Has a portion plate at home that she plans on using.   She brought her own diabetes workbook and plans to use it in conjunction with the resources given today.   Plans to make at least one of her plates using measuring cups.        Task: Reviewed the sources and role of  "Carbohydrate, Protein, and Fat and how each nutrient impacts blood sugar. Completed 1/29/2024        Task: Provided visual examples using dry measuring cups, food models, and other familiar objects such as computer mouse, deck or cards, tennis ball etc. to help with visualization of portions. Completed 1/29/2024        Task: Explained how to count carbohydrates using the food label and the use of dry measuring cups for accurate carb counting. Completed 1/29/2024        Task: Review the importance of balancing carbohydrates with each meal using portion control techniques to count servings of carbohydrate and label reading to identify serving size and amount of total carbs per serving. Completed 1/29/2024        Task: Provided Sample plate method and reviewed the use of the plate to estimate amounts of carbohydrate per meal. Completed 1/29/2024        Problem: Blood Glucose Self-Monitoring         Goal: Patient agrees to check and record blood sugars 2-3x/week.    Start Date: 1/29/2024   Expected End Date: 1/29/2025   Priority: Medium   Barriers: No Barriers Identified   Note:    "Promised" Dr. Scott she would check at least 2x/week.  Showed how to use meter in clinic and thinks she can do more than 2x/week but will see.   BG in clinic 169 mg/dL 2 hours after breakfast.       Task: Reviewed the importance of self-monitoring blood glucose and keeping logs. Completed 1/29/2024        Task: Instructed on how to self-monitor blood glucose using a home glucometer, how to properly dispose of used strips and lancets after use, and how to appropriately store meter and supplies. Completed 1/29/2024        Task: Provided patient with blood glucose logs, reviewed appropriate timing and frequency to SMBG, education on parameters on when to notify provider and advised patient to bring logs to all appts with PCP/Endocrinologist/Diabetes Care Specialist. Completed 1/29/2024        Task: Discussed ways to minimize pain when " monitoring blood glucose. Completed 1/29/2024        Problem: Physical Activity and Exercise         Goal: Patient agrees to increase physical activity to a goal of 3 times per week for 15 minutes.    Start Date: 1/29/2024   Expected End Date: 1/29/2025   Priority: Low   Barriers: Physical Limitations   Note:    Has knee problems.   Has stationary peddler she plans to use while watching tv; plans to increase walking around home; plans to implement chair yoga.  Will start with small goal and increase as tolerated.       Task: Discussed role of physical activity on reducing insulin resistance and improvement in overall glycemic control. Completed 1/29/2024        Task: Discussed role of physical activity as it relates to weight loss Completed 1/29/2024        Task: Offered suggestions on how patient could increase their regular physical activity Completed 1/29/2024        Task: Reviewed blood glucose monitoring before, during and after exercise/activity Completed 1/29/2024          Follow Up Plan     Follow up in about 4 weeks (around 2/26/2024) for completion of assessment, review of logs, follow-up on healthy eating.    Today's care plan and follow up schedule was discussed with patient.  Kelsey verbalized understanding of the care plan, goals, and agrees to follow up plan.        The patient was encouraged to communicate with his/her health care provider/physician and care team regarding his/her condition(s) and treatment.  I provided the patient with my contact information today and encouraged to contact me via phone or [facility] Patient Portal as needed.     Length of Visit   Total Time: 70 Minutes

## 2024-02-08 ENCOUNTER — OFFICE VISIT (OUTPATIENT)
Dept: INTERNAL MEDICINE | Facility: CLINIC | Age: 69
End: 2024-02-08
Payer: MEDICARE

## 2024-02-08 VITALS
HEART RATE: 101 BPM | TEMPERATURE: 97 F | SYSTOLIC BLOOD PRESSURE: 110 MMHG | OXYGEN SATURATION: 100 % | WEIGHT: 258.63 LBS | DIASTOLIC BLOOD PRESSURE: 70 MMHG | BODY MASS INDEX: 47.59 KG/M2 | HEIGHT: 62 IN

## 2024-02-08 DIAGNOSIS — E11.9 TYPE 2 DIABETES MELLITUS WITHOUT COMPLICATION, WITHOUT LONG-TERM CURRENT USE OF INSULIN: Primary | ICD-10-CM

## 2024-02-08 DIAGNOSIS — I10 PRIMARY HYPERTENSION: ICD-10-CM

## 2024-02-08 PROCEDURE — 99214 OFFICE O/P EST MOD 30 MIN: CPT | Mod: S$PBB,,, | Performed by: PHYSICIAN ASSISTANT

## 2024-02-08 PROCEDURE — 99999 PR PBB SHADOW E&M-EST. PATIENT-LVL V: CPT | Mod: PBBFAC,,, | Performed by: PHYSICIAN ASSISTANT

## 2024-02-08 PROCEDURE — 99215 OFFICE O/P EST HI 40 MIN: CPT | Mod: PBBFAC | Performed by: PHYSICIAN ASSISTANT

## 2024-02-08 RX ORDER — OLMESARTAN MEDOXOMIL, AMLODIPINE AND HYDROCHLOROTHIAZIDE TABLET 20/5/12.5 MG 20; 5; 12.5 MG/1; MG/1; MG/1
1 TABLET ORAL DAILY
Qty: 30 TABLET | Refills: 11 | Status: SHIPPED | OUTPATIENT
Start: 2024-02-08 | End: 2025-02-07

## 2024-02-08 RX ORDER — TIRZEPATIDE 5 MG/.5ML
5 INJECTION, SOLUTION SUBCUTANEOUS
Qty: 4 PEN | Refills: 2 | Status: SHIPPED | OUTPATIENT
Start: 2024-02-08 | End: 2024-04-10 | Stop reason: DRUGHIGH

## 2024-02-08 NOTE — PROGRESS NOTES
Subjective:      Patient ID: Kelsey Terrell is a 68 y.o. female.    Chief Complaint: Follow-up    HPI  Here today for a follow up for her uncontrolled diabetes. Recent A1C 10.6. Started on metformin 1000mg qam and mounjaro 2.5. Doing well on both medications and reports compliance.   Today her concern is her low blood pressure. Pt states that over the past couple weeks her blood pressure have been running in 90s-100 systolic 40-60 diastolic. She is currently on tribenzor. Digital HTN. BP running low. Seeing outside cardiologist. Scheduled for apt soon. Admits to feeling tired/fatigued. Denies any fever, shortness of breath, URI symptoms, cough, dizziness/syncope. Urine is dark yellow.     Foot exam and eye exam due.   Follow up and labs scheduled for April.     Eye exam and mammogram scheduled.   Seeing psychiatrist at [facility]. Up to date.       Patient Active Problem List   Diagnosis    Hypertension    Morbid obesity    Hypercholesteremia    Ulnar neuropathy    Injury of peripheral nerve plexus    HLD (hyperlipidemia)    Anxiety    Moderate episode of recurrent major depressive disorder    Mastalgia in female    Type 2 diabetes mellitus without complications         Current Outpatient Medications:     aspirin (ECOTRIN) 81 MG EC tablet, Take 81 mg by mouth once daily. 1 Tablet, Delayed Release (E.C.) Oral Every day, Disp: , Rfl:     atorvastatin (LIPITOR) 40 MG tablet, Take 1 tablet (40 mg total) by mouth once daily., Disp: 90 tablet, Rfl: 0    blood sugar diagnostic Strp, Check sugar before each meal as needed, Disp: 20 each, Rfl: 11    blood-glucose meter kit, Use as instructed, Disp: 1 each, Rfl: 0    ergocalciferol (ERGOCALCIFEROL) 50,000 unit Cap, Take 50,000 Units by mouth every 7 days., Disp: , Rfl:     lancets (LANCETS,THIN) Misc, Check sugar before each meal as needed, Disp: 20 each, Rfl: 11    LORazepam (ATIVAN) 0.5 MG tablet, Take 1 tablet by mouth 3 (three) times daily., Disp: , Rfl:      metFORMIN (GLUCOPHAGE-XR) 500 MG ER 24hr tablet, Take 2 tablets (1,000 mg total) by mouth daily with breakfast., Disp: 60 tablet, Rfl: 11    venlafaxine (EFFEXOR-XR) 150 MG Cp24, Take by mouth once daily. , Disp: , Rfl:     venlafaxine (EFFEXOR-XR) 75 MG 24 hr capsule, Take 75 mg by mouth., Disp: , Rfl:     olmesartan-amLODIPin-hcthiazid (TRIBENZOR) 20-5-12.5 mg Tab, Take 1 tablet by mouth once daily., Disp: 30 tablet, Rfl: 11    tirzepatide (MOUNJARO) 5 mg/0.5 mL PnIj, Inject 5 mg into the skin every 7 days., Disp: 4 pen , Rfl: 2    Review of Systems   Constitutional:  Positive for fatigue. Negative for activity change, appetite change, chills, diaphoresis, fever and unexpected weight change.   HENT: Negative.  Negative for congestion, hearing loss, postnasal drip, rhinorrhea, sore throat, trouble swallowing and voice change.    Eyes: Negative.  Negative for visual disturbance.   Respiratory: Negative.  Negative for cough, choking, chest tightness and shortness of breath.    Cardiovascular:  Negative for chest pain, palpitations and leg swelling.   Gastrointestinal:  Negative for abdominal distention, abdominal pain, blood in stool, constipation, diarrhea, nausea and vomiting.   Endocrine: Negative for cold intolerance, heat intolerance, polydipsia and polyuria.   Genitourinary: Negative.  Negative for difficulty urinating and frequency.   Musculoskeletal:  Negative for arthralgias, back pain, gait problem, joint swelling and myalgias.   Skin:  Negative for color change, pallor, rash and wound.   Neurological:  Negative for dizziness, tremors, weakness, light-headedness, numbness and headaches.   Hematological:  Negative for adenopathy.   Psychiatric/Behavioral:  Negative for behavioral problems, confusion, self-injury, sleep disturbance and suicidal ideas. The patient is not nervous/anxious.      Objective:   /70 (BP Location: Left arm, Patient Position: Sitting, BP Method: Large (Manual))   Pulse 101    "Temp 97.1 °F (36.2 °C) (Tympanic)   Ht 5' 2" (1.575 m)   Wt 117.3 kg (258 lb 9.6 oz)   SpO2 100%   BMI 47.30 kg/m²     Physical Exam  Vitals and nursing note reviewed.   Constitutional:       General: She is not in acute distress.     Appearance: Normal appearance. She is well-developed. She is not ill-appearing, toxic-appearing or diaphoretic.   HENT:      Head: Normocephalic and atraumatic.   Cardiovascular:      Rate and Rhythm: Normal rate and regular rhythm.      Heart sounds: Normal heart sounds. No murmur heard.     No friction rub. No gallop.   Pulmonary:      Effort: Pulmonary effort is normal. No respiratory distress.      Breath sounds: Normal breath sounds. No wheezing or rales.   Musculoskeletal:         General: Normal range of motion.   Skin:     General: Skin is warm.      Capillary Refill: Capillary refill takes less than 2 seconds.      Findings: No rash.   Neurological:      Mental Status: She is alert and oriented to person, place, and time.      Motor: No weakness.      Gait: Gait normal.   Psychiatric:         Mood and Affect: Mood normal.         Behavior: Behavior normal.         Thought Content: Thought content normal.         Judgment: Judgment normal.         Assessment:     1. Type 2 diabetes mellitus without complication, without long-term current use of insulin    2. Primary hypertension      Plan:   Type 2 diabetes mellitus without complication, without long-term current use of insulin  -     tirzepatide (MOUNJARO) 5 mg/0.5 mL PnIj; Inject 5 mg into the skin every 7 days.  Dispense: 4 pen ; Refill: 2    Primary hypertension  -     olmesartan-amLODIPin-hcthiazid (TRIBENZOR) 20-5-12.5 mg Tab; Take 1 tablet by mouth once daily.  Dispense: 30 tablet; Refill: 11      -decreasing tribenzor dose. Follow up with cardiologist as scheduled.   -increase water intake. At least 64 ounces daily. Skip bp med when low. Add pedialyte when bp low.   -if BP continues to stay low and become " symptomatic go to the ER.     -increase mounjaro to 5. Continue metformin 1000 once daily.   -follow up with Dr. Scott as scheduled.       Follow up if symptoms worsen or fail to improve.

## 2024-02-27 DIAGNOSIS — Z00.00 ENCOUNTER FOR MEDICARE ANNUAL WELLNESS EXAM: ICD-10-CM

## 2024-03-22 ENCOUNTER — PATIENT MESSAGE (OUTPATIENT)
Dept: DIABETES | Facility: CLINIC | Age: 69
End: 2024-03-22
Payer: MEDICARE

## 2024-03-24 ENCOUNTER — PATIENT MESSAGE (OUTPATIENT)
Dept: ADMINISTRATIVE | Facility: CLINIC | Age: 69
End: 2024-03-24
Payer: MEDICARE

## 2024-03-26 ENCOUNTER — PATIENT MESSAGE (OUTPATIENT)
Dept: OPHTHALMOLOGY | Facility: CLINIC | Age: 69
End: 2024-03-26

## 2024-03-26 ENCOUNTER — OFFICE VISIT (OUTPATIENT)
Dept: OPHTHALMOLOGY | Facility: CLINIC | Age: 69
End: 2024-03-26
Payer: MEDICARE

## 2024-03-26 DIAGNOSIS — E11.9 TYPE 2 DIABETES MELLITUS WITHOUT RETINOPATHY: Primary | ICD-10-CM

## 2024-03-26 DIAGNOSIS — H25.13 NUCLEAR SCLEROSIS, BILATERAL: ICD-10-CM

## 2024-03-26 DIAGNOSIS — H52.02 HYPEROPIA OF LEFT EYE: ICD-10-CM

## 2024-03-26 DIAGNOSIS — H25.013 CORTICAL AGE-RELATED CATARACT OF BOTH EYES: ICD-10-CM

## 2024-03-26 DIAGNOSIS — E11.36 DIABETIC CATARACT OF BOTH EYES: ICD-10-CM

## 2024-03-26 DIAGNOSIS — E11.9 TYPE 2 DIABETES MELLITUS WITHOUT COMPLICATION, WITHOUT LONG-TERM CURRENT USE OF INSULIN: ICD-10-CM

## 2024-03-26 DIAGNOSIS — H52.4 PRESBYOPIA: ICD-10-CM

## 2024-03-26 PROCEDURE — 99999 PR PBB SHADOW E&M-EST. PATIENT-LVL III: CPT | Mod: PBBFAC,,, | Performed by: OPTOMETRIST

## 2024-03-26 PROCEDURE — 92015 DETERMINE REFRACTIVE STATE: CPT | Mod: ,,, | Performed by: OPTOMETRIST

## 2024-03-26 PROCEDURE — 99213 OFFICE O/P EST LOW 20 MIN: CPT | Mod: PBBFAC | Performed by: OPTOMETRIST

## 2024-03-26 PROCEDURE — 92004 COMPRE OPH EXAM NEW PT 1/>: CPT | Mod: S$PBB,,, | Performed by: OPTOMETRIST

## 2024-03-26 NOTE — PROGRESS NOTES
HPI     Annual Exam            Comments: New to [facility]  Diagnosed with diabetes in Jan '24  Lab Results       Component                Value               Date                       HGBA1C                   10.6 (H)            12/27/2023            Vision changes since last eye exam?: yes, near [place]     Any eye pain today: no    Other ocular symptoms: floaters recently    Interested in contact lens fitting today? no                           Last edited by Alisa Wan on 3/26/2024  8:18 AM.            Assessment /Plan     For exam results, see Encounter Report.    Type 2 diabetes mellitus without retinopathy  There was no diabetic retinopathy present in either eye today.   Recommended that pt continue care with PCP and/or specialists regarding diabetes.  Follow-up dilated eye exam recommended in 12 months, sooner with any vision changes or new concerns.    Nuclear sclerosis, bilateral  Cortical age-related cataract of both eyes  Diabetic cataract of both eyes  Cataracts not significantly affecting activities of daily living and therefore surgery is not indicated at this time.   Will continue to monitor over the next 12 months. Pt to call or RTC with any significant change in vision prior to next visit.     Hyperopia of left eye  Presbyopia  Eyeglass Final Rx       Eyeglass Final Rx         Sphere Add    Right [place] +2.50    Left +0.50 +2.50      Expiration Date: 3/26/2025                  Spec Rx is optional, ok to continue with OTC readers    Normal gOCT today with nice, symmetric GCL OU      RTC 1 yr for dilated eye exam or PRN if any problems.   Discussed above and answered questions.

## 2024-04-01 NOTE — PROGRESS NOTES
Breast Surgical Oncology  [facility]    Date of Service: 2024    SUBJECTIVE:   Chief complaint: breast pain    HISTORY OF PRESENT ILLNESS:   Kelsey Terrell is a 68 y.o. female who is kindly referred by Dr. Harris Scott for left breast pain.    23  She reports a 1 year history of left breast pain which is intermittent in nature.  The pain is focused in the upper outer quadrant and axilla.  She does have a history of an excisional biopsy in her late teens at [facility] for benign pathology. She denies breast concerns such as pain, masses, skin changes, nipple discharge, nipple retraction or lumps under the arm.  She does not wear supportive bra off and does drink 1 cup of caffeine per day.    She has a family history significant for 2 daughters with PTEN pathogenic gene mutations.  Her daughters genetic testing was performed at [facility] and believed to have an inherited from her maternal lineage.  The patient has never had formal genetic counseling or testing.  She is not up-to-date with breast cancer screening.  Her last screening mammogram was in .    23  Diagnostic imaging following the last visit showed normal appearing enlarged lymph nodes in the site with discomfort. This was deemed BIRADS 2. She additionally had genetic testing which was negative for a pathogenic mutation. Her upper outer quadrant breast pain has not resolved. She switched to decaffeinated coffee, supplements her vitamin D, and occasionally wears supportive bras.    24  She is here for follow up of her breast pain. She reports resolution of her pain with evening primrose oil, caffeine cessation, and a supportive bra. She denies breast concerns such as pain, masses, skin changes, nipple discharge, nipple retraction or lumps under the arm.       Her breast cancer risk factor profile is as follows: Menarche at 12, Menopause at 47.  She is . Age at first live birth was 17. Family history of cancer is as follows:  father with head/neck cancer at age 49,  at age 50; daughter with parathyroid cancer (PTEN associated) at age 40,  at age 49.    FAMILY HISTORY:     Family History   Problem Relation Age of Onset    Cataracts Mother     Hypertension Father     Throat cancer Father     Cervical cancer Paternal Aunt     Diabetes Daughter     Other Daughter         ganglion cyst    Cancer Daughter     Other Daughter         lump under armpit, removed    Thyroid cancer Daughter 40        parathyroid    Clotting disorder Daughter     Endometriosis Other     Liver disease Other     Glaucoma Neg Hx     Macular degeneration Neg Hx     Retinal detachment Neg Hx     Strabismus Neg Hx         PAST MEDICAL HISTORY:     Past Medical History:   Diagnosis Date    Chronic bronchitis     Chronic depression     dr montenegro    Difficult intubation     History of Bell's palsy     Left side    Hypercholesteremia     Hypertension     dr roland also    Mitral regurgitation     Morbid obesity     Type 2 diabetes mellitus without complications        SURGICAL HISTORY:     Past Surgical History:   Procedure Laterality Date    BREAST BIOPSY Left     benign-pt was 20 yrs old    closed reduction shoulder dislocation Left     HYSTERECTOMY      noncancer    OOPHORECTOMY      TONSILLECTOMY      TUBAL LIGATION         SOCIAL HISTORY:     Social History     Tobacco Use    Smoking status: Never    Smokeless tobacco: Never   Substance Use Topics    Alcohol use: Yes    Drug use: No        MEDICATIONS/ALLERGIES:     Current Outpatient Medications:     aspirin (ECOTRIN) 81 MG EC tablet, Take 81 mg by mouth once daily. 1 Tablet, Delayed Release (E.C.) Oral Every day, Disp: , Rfl:     atorvastatin (LIPITOR) 40 MG tablet, Take 1 tablet (40 mg total) by mouth once daily., Disp: 90 tablet, Rfl: 0    blood sugar diagnostic Strp, Check sugar before each meal as needed, Disp: 20 each, Rfl: 11    blood-glucose meter kit, Use as instructed, Disp: 1 each, Rfl:  0    ergocalciferol (ERGOCALCIFEROL) 50,000 unit Cap, Take 50,000 Units by mouth every 7 days., Disp: , Rfl:     lancets (LANCETS,THIN) Misc, Check sugar before each meal as needed, Disp: 20 each, Rfl: 11    LORazepam (ATIVAN) 0.5 MG tablet, Take 1 tablet by mouth 3 (three) times daily., Disp: , Rfl:     metFORMIN (GLUCOPHAGE-XR) 500 MG ER 24hr tablet, Take 2 tablets (1,000 mg total) by mouth daily with breakfast., Disp: 60 tablet, Rfl: 11    olmesartan-amLODIPin-hcthiazid (TRIBENZOR) 20-5-12.5 mg Tab, Take 1 tablet by mouth once daily., Disp: 30 tablet, Rfl: 11    tirzepatide (MOUNJARO) 5 mg/0.5 mL PnIj, Inject 5 mg into the skin every 7 days., Disp: 4 pen , Rfl: 2    venlafaxine (EFFEXOR-XR) 150 MG Cp24, Take by mouth once daily. , Disp: , Rfl:     venlafaxine (EFFEXOR-XR) 75 MG 24 hr capsule, Take 75 mg by mouth., Disp: , Rfl:   Review of patient's allergies indicates:  No Known Allergies    REVIEW OF SYSTEMS:   I have reviewed 12 systems, including 2 points per system. Pertinent reported positives are:  Depressed mood, anxiety    PHYSICAL EXAM:   General: The patient appears well and is in no acute distress.     Chaperon present for examination.   BREAST EXAM  No Asymmetry  Right:  - Mass: No  - Skin change: No  - Nipple Discharge: No  - Nipple retraction: No  - Axillary LAD: No  Left:   - Mass: No  - Skin change: No, incision noted in upper outer quadrant  - Nipple Discharge: No  - Nipple retraction: No  - Axillary LAD: No    IMAGING:   Results for orders placed during the hospital encounter of 03/08/23    Mammo Digital Diagnostic Bilat with Jose    Narrative  Result:  Mammo Digital Diagnostic Bilat with Jose  US Breast Left Limited    History:  Patient is 67 y.o. and is seen for diagnostic imaging.    Films Compared:  Compared to: 03/19/2019 Mammo Digital Screening Bilat and 12/28/2015 Mammo Digital Screening Bilat with CAD    Findings:  This procedure was performed using tomosynthesis. Computer-aided  detection was utilized in the interpretation of this examination.  The breasts have scattered areas of fibroglandular density.    Mammo Digital Diagnostic Bilat with Jose  Left  There is no evidence of suspicious masses, calcifications, or other abnormal findings in the left breast.    Right  There is no evidence of suspicious masses, calcifications, or other abnormal findings in the right breast.    US Breast Left Limited  Left  Lymph Node: There is a 21 mm x 14 mm x 23 mm lymph node seen in the left axilla. The lymph node correlates with pain reported by the patient. The lymph node appears morphologically normal with a prominent fatty angela and homogeneously thin cortex.    Impression  Bilateral  Mammo Digital Diagnostic Bilat with Jose  There is no mammographic evidence of malignancy.    Left  US Breast Left Limited  There is no sonographic evidence of malignancy.    BI-RADS Category:  Overall: 2 - Benign    Recommendation:  Routine screening mammogram in 1 year is recommended.      Your estimated lifetime risk of breast cancer (to age 85) based on Tyrer-Cuzick risk assessment model is 4.03 %.  According to the American Cancer Society, patients with a lifetime breast cancer risk of 20% or higher might benefit from supplemental screening tests. ??      PATHOLOGY:   none    ASSESSMENT:     1. Mastalgia in female           PLAN:     We have reviewed that breast pain is overwhelmingly benign.  There is no national guideline that mandates routine mammography for the evaluation of generalized  cyclical breast pain.  I recommend continuing to reduce caffeine intake and vitamin D supplementation can improve the symptom of pain.      She will continue evening primrose oil, caffeine cessation, and supportive bra. She will return in 6 months to see Fiona Flowers NP for a clinical breast examination and to begin weaning off of these interventions.     Her screening mammogram performed today is pending. We will contact her  with the results once the final radiology read is reported.     The patient is in agreement with the plan. Questions were encouraged and answered to patient's satisfaction. Kelsey will call our office with any questions or concerns.       I spent a total of 30 minutes on this visit. This includes face to face time and non-face to face time preparing to see the patient (eg, review of tests), obtaining and/or reviewing separately obtained history, documenting clinical information in the electronic or other health record, independently interpreting results and communicating results to the patient/family/caregiver, or care coordinator.        Helen Wright M.D.

## 2024-04-02 ENCOUNTER — HOSPITAL ENCOUNTER (OUTPATIENT)
Dept: RADIOLOGY | Facility: HOSPITAL | Age: 69
Discharge: HOME | End: 2024-04-02
Attending: SURGERY
Payer: MEDICARE

## 2024-04-02 ENCOUNTER — OFFICE VISIT (OUTPATIENT)
Dept: SURGERY | Facility: CLINIC | Age: 69
End: 2024-04-02
Payer: MEDICARE

## 2024-04-02 DIAGNOSIS — N64.4 MASTALGIA IN FEMALE: Primary | ICD-10-CM

## 2024-04-02 DIAGNOSIS — Z12.31 ENCOUNTER FOR SCREENING MAMMOGRAM FOR BREAST CANCER: ICD-10-CM

## 2024-04-02 PROCEDURE — 77063 BREAST TOMOSYNTHESIS BI: CPT | Mod: TC

## 2024-04-02 PROCEDURE — 99214 OFFICE O/P EST MOD 30 MIN: CPT | Mod: S$PBB,,, | Performed by: SURGERY

## 2024-04-02 PROCEDURE — 77067 SCR MAMMO BI INCL CAD: CPT | Mod: 26,,, | Performed by: RADIOLOGY

## 2024-04-02 PROCEDURE — 77063 BREAST TOMOSYNTHESIS BI: CPT | Mod: 26,,, | Performed by: RADIOLOGY

## 2024-04-05 ENCOUNTER — LAB VISIT (OUTPATIENT)
Dept: LAB | Facility: HOSPITAL | Age: 69
End: 2024-04-05
Payer: MEDICARE

## 2024-04-05 DIAGNOSIS — E11.9 TYPE 2 DIABETES MELLITUS WITHOUT COMPLICATION, WITHOUT LONG-TERM CURRENT USE OF INSULIN: ICD-10-CM

## 2024-04-05 LAB
ALBUMIN/CREAT UR: ABNORMAL UG/MG (ref 0–30)
CREAT UR-MCNC: >450 MG/DL (ref 15–325)
MICROALBUMIN UR DL<=1MG/L-MCNC: 72 UG/ML

## 2024-04-05 PROCEDURE — 82043 UR ALBUMIN QUANTITATIVE: CPT | Performed by: FAMILY MEDICINE

## 2024-04-09 ENCOUNTER — TELEPHONE (OUTPATIENT)
Dept: INTERNAL MEDICINE | Facility: CLINIC | Age: 69
End: 2024-04-09
Payer: MEDICARE

## 2024-04-09 NOTE — TELEPHONE ENCOUNTER
T call was returned and she was informed that a message was sent to Dr. Scott and Gretel Rai about her medication on back order and wanting to increase her dosage.    ----- Message from Kayli Mckinnon sent at 4/9/2024  8:48 AM CDT -----  Contact: [phone]  Patient called in requesting a call from the nurse ,she said the pharmacy said her medicine is on back order, tirzepatide (MOUNJARO) 5 mg/0.5 mL PnIj, can you increase the dose, or call another pharmacy,she has not taken her shot yet that was due on Saturday  please call back [phone]          [id number] - [place] - [address]  [address] [address]  Phone: [phone] Fax: [phone]

## 2024-04-10 ENCOUNTER — TELEPHONE (OUTPATIENT)
Dept: INTERNAL MEDICINE | Facility: CLINIC | Age: 69
End: 2024-04-10
Payer: MEDICARE

## 2024-04-10 DIAGNOSIS — E11.9 TYPE 2 DIABETES MELLITUS WITHOUT COMPLICATION, WITHOUT LONG-TERM CURRENT USE OF INSULIN: Primary | ICD-10-CM

## 2024-04-10 NOTE — TELEPHONE ENCOUNTER
----- Message from Bessie Ordoñez MA sent at 4/9/2024 11:03 AM CDT -----  Contact: [phone]  Pt stated that tirzepatide (MOUNJARO) 5 mg/0.5 mL is on back order and she was wondering if you can increase her dosage.  ----- Message -----  From: Kayli Mckinnon  Sent: 4/9/2024   8:52 AM CDT  To: Tyler MUÑOZ Staff    Patient called in requesting a call from the nurse ,she said the pharmacy said her medicine is on back order, tirzepatide (MOUNJARO) 5 mg/0.5 mL PnAlex, can you increase the dose, or call another pharmacy,she has not taken her shot yet that was due on Saturday  please call back [phone]          [id number] - [place] - [address]  [address] [address]  Phone: [phone] Fax: [phone]

## 2024-04-16 NOTE — PROGRESS NOTES
Subjective:      Patient ID: Kelsey Terrell is a 68 y.o. female.    Chief Complaint: Annual Exam    HPIdm A1c 10 d/wd  Hypertension: blood pressures at home normal. Tolerating medicine.     Past Medical History:   Diagnosis Date    Chronic bronchitis     Chronic depression     dr montenegro    Difficult intubation     History of Bell's palsy 1980's    Left side    Hypercholesteremia     Hypertension     dr roland also    Mitral regurgitation     Morbid obesity     Type 2 diabetes mellitus without complications       Past Surgical History:   Procedure Laterality Date    BREAST BIOPSY Left     benign-pt was 20 yrs old    closed reduction shoulder dislocation Left     HYSTERECTOMY      noncancer    OOPHORECTOMY      TONSILLECTOMY      TUBAL LIGATION        Social History     Socioeconomic History    Marital status:    Tobacco Use    Smoking status: Never    Smokeless tobacco: Never   Substance and Sexual Activity    Alcohol use: Yes    Drug use: No    Sexual activity: Not Currently     Social Determinants of Health     Financial Resource Strain: Low Risk  (2/7/2024)    Overall Financial Resource Strain (CARDIA)     Difficulty of Paying Living Expenses: Not very hard   Food Insecurity: No Food Insecurity (2/7/2024)    Hunger Vital Sign     Worried About Running Out of Food in the Last Year: Never true     Ran Out of Food in the Last Year: Never true   Transportation Needs: No Transportation Needs (2/7/2024)    PRAPARE - Transportation     Lack of Transportation (Medical): No     Lack of Transportation (Non-Medical): No   Physical Activity: Inactive (2/7/2024)    Exercise Vital Sign     Days of Exercise per Week: 0 days     Minutes of Exercise per Session: 0 min   Stress: Stress Concern Present (2/7/2024)    [employer] [place] [employer] - Occupational Stress Questionnaire     Feeling of Stress : Very much   Social Connections: Unknown (2/7/2024)    Social Connection and Isolation Panel [NHANES]      Frequency of Communication with Friends and Family: More than three times a week     Frequency of Social Gatherings with Friends and Family: Never     Active Member of Clubs or Organizations: Yes     Attends Club or Organization Meetings: More than 4 times per year     Marital Status:    Housing Stability: Low Risk  (2/7/2024)    Housing Stability Vital Sign     Unable to Pay for Housing in the Last Year: No     Number of Places Lived in the Last Year: 1     Unstable Housing in the Last Year: No      Family History   Problem Relation Name Age of Onset    Cataracts Mother Syeda     Hypertension Father Sae Sr.     Throat cancer Father Sae Sr.     Cervical cancer Paternal Aunt      Diabetes Daughter Lisha     Other Daughter Lisha         ganglion cyst    Cancer Daughter Milla     Other Daughter Milla         lump under armpit, removed    Thyroid cancer Daughter Milla 40        parathyroid    Clotting disorder Daughter Milla     Endometriosis Other      Liver disease Other      Glaucoma Neg Hx      Macular degeneration Neg Hx      Retinal detachment Neg Hx      Strabismus Neg Hx        Review of Systems        Objective:     Physical Exam  Vitals and nursing note reviewed.   Constitutional:       Appearance: She is well-developed.   HENT:      Head: Normocephalic and atraumatic.   Neck:      Vascular: No carotid bruit.   Cardiovascular:      Rate and Rhythm: Normal rate and regular rhythm.   Pulmonary:      Effort: Pulmonary effort is normal.      Breath sounds: Normal breath sounds.   Abdominal:      General: Bowel sounds are normal. There is no distension.      Palpations: Abdomen is soft. There is no mass.      Tenderness: There is no abdominal tenderness. There is no guarding or rebound.   Musculoskeletal:      Cervical back: Normal range of motion and neck supple.   Lymphadenopathy:      Cervical: No cervical adenopathy.   Skin:     General: Skin is warm and dry.   Neurological:       Mental Status: She is alert and oriented to person, place, and time.   Psychiatric:         Behavior: Behavior normal.         Judgment: Judgment normal.       Assessment:         ICD-10-CM ICD-9-CM   1. Type 2 diabetes mellitus without complication, without long-term current use of insulin  E11.9 250.00   2. Morbid obesity  E66.01 278.01   3. Primary hypertension  I10 401.9   4. Moderate episode of recurrent major depressive disorder  F33.1 296.32      Plan:    F/u major one month  Also Lab and follow up after in 3 months       1. Type 2 diabetes mellitus without complication, without long-term current use of insulin  -     Hemoglobin A1C; Future; Expected date: 04/07/2024  -     Comprehensive Metabolic Panel; Future; Expected date: 04/08/2024  -     Microalbumin/Creatinine Ratio, Urine; Future; Expected date: 04/08/2024  -     TSH; Future; Expected date: 04/08/2024  -     Ambulatory referral/consult to Diabetes Education; Future; Expected date: 01/15/2024  -     Ambulatory referral/consult to Optometry; Future; Expected date: 01/15/2024    2. Morbid obesity    3. Primary hypertension    4. Moderate episode of recurrent major depressive disorder    Other orders  -     blood-glucose meter kit; Use as instructed  Dispense: 1 each; Refill: 0  -     blood sugar diagnostic Strp; Check sugar before each meal as needed  Dispense: 20 each; Refill: 11  -     lancets (LANCETS,THIN) Misc; Check sugar before each meal as needed  Dispense: 20 each; Refill: 11  -     metFORMIN (GLUCOPHAGE-XR) 500 MG ER 24hr tablet; Take 2 tablets (1,000 mg total) by mouth daily with breakfast.  Dispense: 60 tablet; Refill: 11  -     Discontinue: tirzepatide (MOUNJARO) 2.5 mg/0.5 mL PnIj; Inject 2.5 mg into the skin every 7 days.  Dispense: 4 Pen; Refill: 1

## 2024-04-29 ENCOUNTER — TELEPHONE (OUTPATIENT)
Dept: INTERNAL MEDICINE | Facility: CLINIC | Age: 69
End: 2024-04-29
Payer: MEDICARE

## 2024-04-29 NOTE — TELEPHONE ENCOUNTER
Tried calling the patient concerning her appointment with Dr Scott on 4/30/24 this morning with no answer. Dr Scott will not be in clinic at the following time. A message was left for a return call to Dr Scott office.     A second attempt was made to contact the patient with no answer. A message was left for a return call to Dr Scott office. The patient appointment scheduled for 4/30/24 will be canceled.

## 2024-07-10 NOTE — TELEPHONE ENCOUNTER
----- Message from Harris Scott MD sent at 2/21/2017 10:22 AM CST -----  Please send low chol handout  F/u 6months   Additional Notes: Biopsy and pathology report states right side of body. Patient and scar proved on the left side of the body. Render Risk Assessment In Note?: no Detail Level: Simple

## 2024-08-13 ENCOUNTER — OFFICE VISIT (OUTPATIENT)
Dept: INTERNAL MEDICINE | Facility: CLINIC | Age: 69
End: 2024-08-13
Payer: MEDICARE

## 2024-08-13 VITALS
OXYGEN SATURATION: 98 % | BODY MASS INDEX: 43.13 KG/M2 | RESPIRATION RATE: 16 BRPM | HEART RATE: 109 BPM | WEIGHT: 234.38 LBS | SYSTOLIC BLOOD PRESSURE: 124 MMHG | HEIGHT: 62 IN | DIASTOLIC BLOOD PRESSURE: 80 MMHG | TEMPERATURE: 97 F

## 2024-08-13 DIAGNOSIS — E66.01 MORBID OBESITY: ICD-10-CM

## 2024-08-13 DIAGNOSIS — E11.9 TYPE 2 DIABETES MELLITUS WITHOUT COMPLICATION, WITHOUT LONG-TERM CURRENT USE OF INSULIN: Primary | ICD-10-CM

## 2024-08-13 DIAGNOSIS — I10 PRIMARY HYPERTENSION: ICD-10-CM

## 2024-08-13 DIAGNOSIS — N18.31 CHRONIC KIDNEY DISEASE, STAGE 3A: ICD-10-CM

## 2024-08-13 PROCEDURE — 99999 PR PBB SHADOW E&M-EST. PATIENT-LVL V: CPT | Mod: PBBFAC,,, | Performed by: FAMILY MEDICINE

## 2024-08-13 PROCEDURE — G2211 COMPLEX E/M VISIT ADD ON: HCPCS | Mod: S$PBB,,, | Performed by: FAMILY MEDICINE

## 2024-08-13 PROCEDURE — 99214 OFFICE O/P EST MOD 30 MIN: CPT | Mod: S$PBB,,, | Performed by: FAMILY MEDICINE

## 2024-08-13 PROCEDURE — 99215 OFFICE O/P EST HI 40 MIN: CPT | Mod: PBBFAC | Performed by: FAMILY MEDICINE

## 2024-08-13 RX ORDER — MELOXICAM 15 MG/1
15 TABLET ORAL
COMMUNITY
Start: 2024-04-19 | End: 2024-08-13

## 2024-08-13 NOTE — PROGRESS NOTES
Subjective:      Patient ID: Kelsey Terrell is a . female.    Chief Complaint: Multiple issues see below      HPIdm A1c <7 d/wd; was off mounjaro few weeks and resumed at 7.5 and gi upset and attrib to metform so has been off. She declines to check home glucoses . She and daught req q 3 m A1c and cgm to help address more freq sug checks  Hypertension: blood pressures at home normal. Tolerating medicine.     Past Medical History:   Diagnosis Date    Chronic bronchitis     Chronic depression     dr montenegro    Difficult intubation     History of Bell's palsy 1980's    Left side    Hypercholesteremia     Hypertension     dr roland also    Mitral regurgitation     Morbid obesity     Type 2 diabetes mellitus without complications       Past Surgical History:   Procedure Laterality Date    BREAST BIOPSY Left     benign-pt was 20 yrs old    closed reduction shoulder dislocation Left     HYSTERECTOMY      noncancer    OOPHORECTOMY      TONSILLECTOMY      TUBAL LIGATION        Social History     Socioeconomic History    Marital status:    Tobacco Use    Smoking status: Never    Smokeless tobacco: Never   Substance and Sexual Activity    Alcohol use: Yes    Drug use: No    Sexual activity: Not Currently     Social Determinants of Health     Financial Resource Strain: Low Risk  (2/7/2024)    Overall Financial Resource Strain (CARDIA)     Difficulty of Paying Living Expenses: Not very hard   Food Insecurity: No Food Insecurity (2/7/2024)    Hunger Vital Sign     Worried About Running Out of Food in the Last Year: Never true     Ran Out of Food in the Last Year: Never true   Transportation Needs: No Transportation Needs (2/7/2024)    PRAPARE - Transportation     Lack of Transportation (Medical): No     Lack of Transportation (Non-Medical): No   Physical Activity: Inactive (2/7/2024)    Exercise Vital Sign     Days of Exercise per Week: 0 days     Minutes of Exercise per Session: 0 min   Stress: Stress Concern Present  (2/7/2024)    [employer] [place] [employer] - Occupational Stress Questionnaire     Feeling of Stress : Very much   Social Connections: Unknown (2/7/2024)    Social Connection and Isolation Panel [NHANES]     Frequency of Communication with Friends and Family: More than three times a week     Frequency of Social Gatherings with Friends and Family: Never     Active Member of Clubs or Organizations: Yes     Attends Club or Organization Meetings: More than 4 times per year     Marital Status:    Housing Stability: Low Risk  (2/7/2024)    Housing Stability Vital Sign     Unable to Pay for Housing in the Last Year: No     Number of Places Lived in the Last Year: 1     Unstable Housing in the Last Year: No      Family History   Problem Relation Name Age of Onset    Cataracts Mother Syeda     Hypertension Father Isadore Sr.     Throat cancer Father Isadore Sr.     Cervical cancer Paternal Aunt      Diabetes Daughter Lisha     Other Daughter Lisha         ganglion cyst    Cancer Daughter Milla     Other Daughter Milla         lump under armpit, removed    Thyroid cancer Daughter Milla 40        parathyroid    Clotting disorder Daughter Milla     Endometriosis Other      Liver disease Other      Glaucoma Neg Hx      Macular degeneration Neg Hx      Retinal detachment Neg Hx      Strabismus Neg Hx        Review of Systems        Objective:   Daught on phone and husb in room  Physical Exam  Vitals and nursing note reviewed.   Constitutional:       Appearance: She is well-developed.   HENT:      Head: Normocephalic and atraumatic.   Neck:      Vascular: No carotid bruit.   Cardiovascular:      Rate and Rhythm: Normal rate and regular rhythm.   Pulmonary:      Effort: Pulmonary effort is normal.      Breath sounds: Normal breath sounds.   .   Lymphadenopathy:      Cervical: No cervical adenopathy.   Skin:     General: Skin is warm and dry.   Neurological:      Mental Status: She is alert and  oriented to person, place, and time.   Psychiatric:         Behavior: Behavior normal.         Judgment: Judgment normal.       Assessment:         ICD-10-CM ICD-9-CM   1. Type 2 diabetes mellitus without complication, without long-term current use of insulin  E11.9 250.00   2. Morbid obesity  E66.01 278.01   3. Primary hypertension  I10 401.9   4.         Plan:   a  Also Lab and follow up after in 3 months   Req q 3month lab and visit  She req cgm      Type 2 diabetes mellitus without complication, without long-term current use of insulin  -     Ambulatory referral/consult to Diabetes Education; Future; Expected date: 08/20/2024  -     Hemoglobin A1C; Future; Expected date: 11/11/2024  -     Lipid Panel; Future; Expected date: 11/11/2024  -     Comprehensive Metabolic Panel; Future; Expected date: 11/13/2024    Primary hypertension    Morbid obesity    Chronic kidney disease, stage 3a

## 2024-08-28 ENCOUNTER — TELEPHONE (OUTPATIENT)
Dept: DIABETES | Facility: CLINIC | Age: 69
End: 2024-08-28
Payer: MEDICARE

## 2024-09-03 ENCOUNTER — CLINICAL SUPPORT (OUTPATIENT)
Dept: DIABETES | Facility: CLINIC | Age: 69
End: 2024-09-03
Payer: MEDICARE

## 2024-09-03 VITALS — BODY MASS INDEX: 41.01 KG/M2 | WEIGHT: 224.19 LBS

## 2024-09-03 DIAGNOSIS — E11.9 TYPE 2 DIABETES MELLITUS WITHOUT COMPLICATION, WITHOUT LONG-TERM CURRENT USE OF INSULIN: ICD-10-CM

## 2024-09-03 PROCEDURE — G0108 DIAB MANAGE TRN  PER INDIV: HCPCS | Mod: PBBFAC | Performed by: DIETITIAN, REGISTERED

## 2024-09-03 PROCEDURE — 99999 PR PBB SHADOW E&M-EST. PATIENT-LVL III: CPT | Mod: PBBFAC,,, | Performed by: DIETITIAN, REGISTERED

## 2024-09-03 PROCEDURE — 99999PBSHW PR PBB SHADOW TECHNICAL ONLY FILED TO HB: Mod: PBBFAC,,,

## 2024-09-03 PROCEDURE — 99213 OFFICE O/P EST LOW 20 MIN: CPT | Mod: PBBFAC | Performed by: DIETITIAN, REGISTERED

## 2024-09-03 NOTE — PROGRESS NOTES
Diabetes Care Specialist Progress Note  Author: Anabel Grover RD, CDE  Date: 9/3/2024    Intake    Program Intake  Reason for Diabetes Program Visit:: Initial Diabetes Assessment (DM referral 8/13/24 Harris Scott MD)  Type of Intervention:: Individual  Education: Self-Management Skill Review  Current diabetes risk level:: high  In the last 12 months, have you::  (pt denies recent ER/hosp visits)    Current Diabetes Treatment: DM Injectables, Diet/Exercise (Mounjaro 7.5 mg weekly.)    Continuous Glucose Monitoring  Patient has CGM: No (Pt inquiring on types of CGM devices today in clinic and reports discussing this with Dr. Scott.)    Lab Results   Component Value Date    HGBA1C 6.3 (H) 04/05/2024       Weight: 101.7 kg (224 lb 3.3 oz)       Body mass index is 41.01 kg/m².    Lifestyle Coping Support & Clinical    Lifestyle/Coping/Support  Does anyone in your family have diabetes or does anyone in your family support you in your diabetes care?: Pt reports having 2 dtrs (one passed) with diabetes. Pt reports  assists with meals, grocery shopping. He also has diabetes, but pt reports that he doesn't follow healthy guidelines (fried foods, desserts, etc).  List anything about Diabetes that causes you stress?:  brings unhealthy foods into the home and offers these to pt.  How do you deal with stress/distress?: Prayer, psychiatrist.  Learning Barriers:: Visual (Pt wears glasses.)  Culture or Anglican beliefs that may impact ability to access healthcare: No  Psychosocial/Coping Skills Assessment Completed: : Yes  Assessment indicates:: Adequate understanding  Area of need?: Yes    Problem Review  Active Comorbidities:  (HTN, HLD, CKD3, Obesity by BMI, Mental ds)    Diabetes Self-Management Skills Assessment    Medication Skills Assessment  Patient is able to identify current diabetes medications, dosages, and appropriate timing of medications.: yes  Patient reports problems or concerns with  current medication regimen.: no  Medication Skills Assessment Completed:: Yes  Assessment indicates:: Adequate understanding  Area of need?: No    Diabetes Disease Process/Treatment Options  What are your goals for this education session?: Improve diet.  Diabetes Disease Process/Treatment Options: Skills Assessment Completed: No  Deferred due to:: Time  Area of need?: Deferred    Nutrition/Healthy Eating  Meal Plan 24 Hour Recall - Breakfast: none  Meal Plan 24 Hour Recall - Lunch: none  Meal Plan 24 Hour Recall - Dinner: 730p [employer] 1c pasta w/ meatball  Meal Plan 24 Hour Recall - Snack: hs - watermelon  Meal Plan 24 Hour Recall - Beverage: water, crystal light/sf flv  Who shops/cooks?:   Patient can identify foods that impact blood sugar.:  (m.potatoes, corn, noodles)  Challenges to healthy eating::  (Irregular meals, portions, fried foods and desserts.)  Nutrition/Healthy Eating Skills Assessment Completed:: Yes  Assessment indicates:: Instruction Needed  Area of need?: Yes    Physical Activity/Exercise  Physical Activity/Exercise Skills Assessment Completed: : No  Deffered due to:: Time  Area of need?: Deferred    Home Blood Glucose Monitoring  Patient states that blood sugar is checked at home daily.: no (Pt declines BG testing. She has several questions today including Eversense CGM. She reports having CGM discussion with Dr. Scott.)  Home Blood Glucose Monitoring Skills Assessment Completed: : Yes  Assessment indicates:: Adequate understanding  Area of need?: Yes    Acute Complications  Acute Complications Skills Assessment Completed: : No  Deffered due to:: Time  Area of need?: Deferred    Chronic Complications  Chronic Complications Skills Assessment Completed: : No  Deferred due to:: Time  Area of need?: Deferred      Assessment Summary and Plan  Based on today's diabetes care assessment, the following areas of need were identified:          9/3/2024    12:01 AM   Areas of Need    Medications/Current Diabetes Treatment No   Lifestyle Coping Support Yes - encouraged pt to continue seeing psych for medical mgmt; see care plan related to meal plan goal setting   Diabetes Disease Process/Treatment Options Deferred   Nutrition/Healthy Eating Yes - see care plan   Physical Activity/Exercise Deferred   Home Blood Glucose Monitoring Yes - Answered pt's questions related to different types of CGM devices. Informed that we do not offer Eversense at this time. Instructed that Dr. Scott or endocrine provider would need to enter Rx for CGM, and then she would be trained by our education team.    Acute Complications Deferred   Chronic Complications Deferred       Today's interventions were provided through individual discussion, instruction, and written materials were provided.    Patient verbalized understanding of instruction and written materials.  Pt was able to return back demonstration of instructions today. Patient understood key points, needs reinforcement and further instruction.     Diabetes Self-Management Care Plan:  Today's Diabetes Self-Management Care Plan was developed with Kelsey's input. Kelsey has agreed to work toward the following goal(s) to improve his/her overall diabetes control.      Care Plan: Diabetes Management   Updates made since 8/4/2024 12:00 AM        Problem: Healthy Eating         Goal: Eat 3 meals daily with 30-45g/2-3 servings of Carbohydrate per meal.    Start Date: 9/3/2024   Expected End Date: 8/13/2025   Priority: High   Barriers: Other (comments)   Note:    Pt agrees to keep weekly grocery list of healthy foods for  to use.        Task: Reviewed the sources and role of Carbohydrate, Protein, and Fat and how each nutrient impacts blood sugar. Completed 9/3/2024        Task: Discussed strategies for choosing healthier menu options when dining out. Completed 9/3/2024        Task: Review the importance of balancing carbohydrates with each meal using portion  control techniques to count servings of carbohydrate and label reading to identify serving size and amount of total carbs per serving. Completed 9/3/2024        Task: Provided Sample plate method and reviewed the use of the plate to estimate amounts of carbohydrate per meal. Completed 9/3/2024          Follow Up Plan   Follow up in about 4 weeks (around 10/1/2024).  -eval goals  -review readiness to test BG    Today's care plan and follow up schedule was discussed with patient.  Kelsey verbalized understanding of the care plan, goals, and agrees to follow up plan.        The patient was encouraged to communicate with his/her health care provider/physician and care team regarding his/her condition(s) and treatment.  I provided the patient with my contact information today and encouraged to contact me via phone or [facility] Patient Portal as needed.     Length of Visit   Total Time: 60 Minutes

## 2024-09-11 NOTE — PROGRESS NOTES
Breast Surgical Oncology  [place]    Date of Service: 09/11/2024    SUBJECTIVE:   Chief complaint: breast pain- f/u  Last visit was with Dr. Wright- 4/2/24    HISTORY OF PRESENT ILLNESS:   Kelsey Terrell is a 69 y.o. female who is kindly referred by Dr. Harris Scott for left breast pain.    2/27/23  She reports a 1 year history of left breast pain which is intermittent in nature.  The pain is focused in the upper outer quadrant and axilla.  She does have a history of an excisional biopsy in her late teens at [facility] for benign pathology. She denies breast concerns such as pain, masses, skin changes, nipple discharge, nipple retraction or lumps under the arm.  She does not wear supportive bra off and does drink 1 cup of caffeine per day.    She has a family history significant for 2 daughters with PTEN pathogenic gene mutations.  Her daughters genetic testing was performed at [facility] and believed to have an inherited from her maternal lineage.  The patient has never had formal genetic counseling or testing.  She is not up-to-date with breast cancer screening.  Her last screening mammogram was in 2019.    8/28/23  Diagnostic imaging following the last visit showed normal appearing enlarged lymph nodes in the site with discomfort. This was deemed BIRADS 2. She additionally had genetic testing which was negative for a pathogenic mutation. Her upper outer quadrant breast pain has not resolved. She switched to decaffeinated coffee, supplements her vitamin D, and occasionally wears supportive bras.    4/2/24  She is here for follow up of her breast pain. She reports resolution of her pain with evening primrose oil, caffeine cessation, and a supportive bra. She denies breast concerns such as pain, masses, skin changes, nipple discharge, nipple retraction or lumps under the arm.     10/2/24  F/u breast pain- pt again reports she has not had any other episodes of breast pain after decreasing her caffeine,  wearing a better bra and using evening primrose oil.   No change in family history      Her breast cancer risk factor profile is as follows: Menarche at 12, Menopause at 47.  She is . Age at first live birth was 17. Family history of cancer is as follows: father with head/neck cancer at age 49,  at age 50; daughter with parathyroid cancer (PTEN associated) at age 40,  at age 49.    Pt had genetic testing that was negative    FAMILY HISTORY:     Family History   Problem Relation Name Age of Onset    Cataracts Mother Syeda     Hypertension Father Isaurelioe Sr.     Throat cancer Father Isadore Sr.     Cervical cancer Paternal Aunt      Diabetes Daughter Lisha     Other Daughter Lisha         ganglion cyst    Cancer Daughter Milla     Other Daughter Milla         lump under armpit, removed    Thyroid cancer Daughter Milla 40        parathyroid    Clotting disorder Daughter Milla     Endometriosis Other      Liver disease Other      Glaucoma Neg Hx      Macular degeneration Neg Hx      Retinal detachment Neg Hx      Strabismus Neg Hx          PAST MEDICAL HISTORY:     Past Medical History:   Diagnosis Date    Chronic bronchitis     Chronic depression     dr montenegro    Difficult intubation     History of Bell's palsy     Left side    Hypercholesteremia     Hypertension     dr roland also    Mitral regurgitation     Morbid obesity     Type 2 diabetes mellitus without complications        SURGICAL HISTORY:     Past Surgical History:   Procedure Laterality Date    BREAST BIOPSY Left     benign-pt was 20 yrs old    closed reduction shoulder dislocation Left     HYSTERECTOMY      noncancer    OOPHORECTOMY      TONSILLECTOMY      TUBAL LIGATION         SOCIAL HISTORY:     Social History     Tobacco Use    Smoking status: Never    Smokeless tobacco: Never   Substance Use Topics    Alcohol use: Yes    Drug use: No        MEDICATIONS/ALLERGIES:     Current Outpatient Medications:     aspirin  (ECOTRIN) 81 MG EC tablet, Take 81 mg by mouth once daily. 1 Tablet, Delayed Release (E.C.) Oral Every day, Disp: , Rfl:     atorvastatin (LIPITOR) 40 MG tablet, Take 1 tablet (40 mg total) by mouth once daily., Disp: 90 tablet, Rfl: 0    blood sugar diagnostic Strp, Check sugar before each meal as needed, Disp: 20 each, Rfl: 11    blood-glucose meter kit, Use as instructed, Disp: 1 each, Rfl: 0    ergocalciferol (ERGOCALCIFEROL) 50,000 unit Cap, Take 50,000 Units by mouth every 7 days., Disp: , Rfl:     lancets (LANCETS,THIN) Misc, Check sugar before each meal as needed, Disp: 20 each, Rfl: 11    LORazepam (ATIVAN) 0.5 MG tablet, Take 1 tablet by mouth 3 (three) times daily., Disp: , Rfl:     olmesartan-amLODIPin-hcthiazid (TRIBENZOR) 20-5-12.5 mg Tab, Take 1 tablet by mouth once daily., Disp: 30 tablet, Rfl: 11    tirzepatide 7.5 mg/0.5 mL PnIj, Inject 7.5 mg into the skin every 7 days., Disp: 12 Pen, Rfl: 3    venlafaxine (EFFEXOR-XR) 150 MG Cp24, Take by mouth once daily. , Disp: , Rfl:     venlafaxine (EFFEXOR-XR) 75 MG 24 hr capsule, Take 75 mg by mouth., Disp: , Rfl:   Review of patient's allergies indicates:  No Known Allergies    REVIEW OF SYSTEMS:   I have reviewed 12 systems, including 2 points per system. Pertinent reported positives are:  Depressed mood, anxiety    PHYSICAL EXAM:   General: The patient appears well and is in no acute distress.       BREAST EXAM  No Asymmetry  Right:  - Mass: No  - Skin change: No  - Nipple Discharge: No  - Nipple retraction: No  - Axillary LAD: No  Left:   - Mass: No  - Skin change: No, incision noted in upper outer quadrant  - Nipple Discharge: No  - Nipple retraction: No  - Axillary LAD: No    IMAGING:   Results for orders placed during the hospital encounter of 03/08/23    Mammo Digital Diagnostic Bilat with Jose    Narrative  Result:  Mammo Digital Diagnostic Bilat with Jose  US Breast Left Limited    History:  Patient is 67 y.o. and is seen for diagnostic  imaging.    Films Compared:  Compared to: 03/19/2019 Mammo Digital Screening Bilat and 12/28/2015 Mammo Digital Screening Bilat with CAD    Findings:  This procedure was performed using tomosynthesis. Computer-aided detection was utilized in the interpretation of this examination.  The breasts have scattered areas of fibroglandular density.    Mammo Digital Diagnostic Bilat with Jose  Left  There is no evidence of suspicious masses, calcifications, or other abnormal findings in the left breast.    Right  There is no evidence of suspicious masses, calcifications, or other abnormal findings in the right breast.    US Breast Left Limited  Left  Lymph Node: There is a 21 mm x 14 mm x 23 mm lymph node seen in the left axilla. The lymph node correlates with pain reported by the patient. The lymph node appears morphologically normal with a prominent fatty angela and homogeneously thin cortex.    Impression  Bilateral  Mammo Digital Diagnostic Bilat with Jose  There is no mammographic evidence of malignancy.    Left  US Breast Left Limited  There is no sonographic evidence of malignancy.    BI-RADS Category:  Overall: 2 - Benign    Recommendation:  Routine screening mammogram in 1 year is recommended.      Your estimated lifetime risk of breast cancer (to age 85) based on Tyrer-Cuzick risk assessment model is 4.03 %.  According to the American Cancer Society, patients with a lifetime breast cancer risk of 20% or higher might benefit from supplemental screening tests. ??    4/2/24  Result:  Mammo Digital Screening Bilat w/ Jose     History:  Patient is 68 y.o. and is seen for a screening mammogram.        Films Compared:  Compared to: 03/08/2023 Mammo Digital Diagnostic Bilat with Jose and 03/08/2023 US Breast Left Limited      Findings:  This procedure was performed using tomosynthesis.   Computer-aided detection was utilized in the interpretation of this examination.     The breasts have scattered areas of fibroglandular  density. There is no evidence of suspicious masses, microcalcifications or architectural distortion.     Impression:   No mammographic evidence of malignancy.     BI-RADS Category 1: Negative     Recommendation:  Routine screening mammogram in 1 year is recommended.     Your estimated lifetime risk of breast cancer (to age 85) based on Tyrer-Cuzick risk assessment model is 3.19 %.  According to the American Cancer Society, patients with a lifetime breast cancer risk of 20% or higher might benefit from supplemental screening tests, such as screening breast MRI.      PATHOLOGY:   Pt reports benign biopsy at age 20     ASSESSMENT:     1. Mastodynia    2. Encounter for screening breast examination    3. Counseling and coordination of care    4. Counseling on health promotion and disease prevention         PLAN:     Explained that breast pain is overwhelmingly benign.  There is no national guideline that mandates routine mammography for the evaluation of generalized  cyclical breast pain.  I recommend continuing to reduce caffeine intake and vitamin D supplementation can improve the symptom of pain.      She will try to titrate gradually off the evening primrose oil, continue caffeine cessation, and wear a supportive bra. She will return in April 2025 for justin screening mammo and f/u with her pcp.     Clinical exam today was without concerning findings    4/2/24- mammo wnl    The patient is in agreement with the plan. Questions were encouraged and answered to patient's satisfaction. Kelsey will call our office with any questions or concerns.       I spent a total of 20 minutes on this visit. This includes face to face time and non-face to face time preparing to see the patient (eg, review of tests), obtaining and/or reviewing separately obtained history, documenting clinical information in the electronic or other health record, independently interpreting results and communicating results to the patient/family/caregiver,  or care coordinator.        Fiona Flowers NP

## 2024-10-02 ENCOUNTER — CLINICAL SUPPORT (OUTPATIENT)
Dept: DIABETES | Facility: CLINIC | Age: 69
End: 2024-10-02
Payer: MEDICARE

## 2024-10-02 ENCOUNTER — OFFICE VISIT (OUTPATIENT)
Dept: SURGERY | Facility: CLINIC | Age: 69
End: 2024-10-02
Payer: MEDICARE

## 2024-10-02 VITALS — WEIGHT: 221.56 LBS | BODY MASS INDEX: 40.52 KG/M2

## 2024-10-02 DIAGNOSIS — Z71.89 COUNSELING ON HEALTH PROMOTION AND DISEASE PREVENTION: ICD-10-CM

## 2024-10-02 DIAGNOSIS — E11.9 TYPE 2 DIABETES MELLITUS WITHOUT COMPLICATION, WITHOUT LONG-TERM CURRENT USE OF INSULIN: Primary | ICD-10-CM

## 2024-10-02 DIAGNOSIS — Z71.89 COUNSELING AND COORDINATION OF CARE: ICD-10-CM

## 2024-10-02 DIAGNOSIS — N64.4 MASTODYNIA: Primary | ICD-10-CM

## 2024-10-02 DIAGNOSIS — Z12.39 ENCOUNTER FOR SCREENING BREAST EXAMINATION: ICD-10-CM

## 2024-10-02 PROCEDURE — 99213 OFFICE O/P EST LOW 20 MIN: CPT | Mod: S$PBB,,, | Performed by: NURSE PRACTITIONER

## 2024-10-02 PROCEDURE — 99999 PR PBB SHADOW E&M-EST. PATIENT-LVL III: CPT | Mod: PBBFAC,,, | Performed by: DIETITIAN, REGISTERED

## 2024-10-02 PROCEDURE — 99999 PR PBB SHADOW E&M-EST. PATIENT-LVL II: CPT | Mod: PBBFAC,,, | Performed by: NURSE PRACTITIONER

## 2024-10-02 PROCEDURE — G0108 DIAB MANAGE TRN  PER INDIV: HCPCS | Mod: PBBFAC | Performed by: DIETITIAN, REGISTERED

## 2024-10-02 PROCEDURE — 99212 OFFICE O/P EST SF 10 MIN: CPT | Mod: PBBFAC,27 | Performed by: NURSE PRACTITIONER

## 2024-10-02 PROCEDURE — 99999PBSHW PR PBB SHADOW TECHNICAL ONLY FILED TO HB: Mod: PBBFAC,,,

## 2024-10-02 PROCEDURE — 99213 OFFICE O/P EST LOW 20 MIN: CPT | Mod: PBBFAC | Performed by: DIETITIAN, REGISTERED

## 2024-10-02 NOTE — PROGRESS NOTES
Diabetes Care Specialist Progress Note  Author: Anabel Grover RD, CDE  Date: 10/2/2024    Intake    Program Intake  Reason for Diabetes Program Visit:: Intervention (DM referral 8/13/24 Harrsi Scott MD)  Type of Intervention:: Individual  Education: Self-Management Skill Review  Current diabetes risk level:: high  In the last 12 months, have you::  (pt denies recent ER/hosp visits)    Current Diabetes Treatment: DM Injectables, Diet/Exercise (Mounjaro 7.5 mg weekly. Pt reports unable to receive Rx and messaged provider Dr. Scott to assist w/ 5mg strength.)    Lab Results   Component Value Date    HGBA1C 6.3 (H) 04/05/2024     Weight: 100.5 kg (221 lb 9 oz)       Body mass index is 40.52 kg/m².    Lifestyle Coping Support & Clinical    Problem Review  Active Comorbidities:  (HTN, HLD, CKD3, Obesity by BMI, Mental ds)    Diabetes Self-Management Skills Assessment    Medication Skills Assessment  Patient is able to identify current diabetes medications, dosages, and appropriate timing of medications.: yes  Patient reports problems or concerns with current medication regimen.: yes (pharmacy availability)  Medication Skills Assessment Completed:: Yes  Assessment indicates:: Adequate understanding  Area of need?: Yes     Nutrition/Healthy Eating  Meal Plan 24 Hour Recall - Breakfast: none  Meal Plan 24 Hour Recall - Lunch: none OR pb/j sandwich on white w/ 2% milk OR meat (pork chop), veggies (1/2c peas/carrots - using portion plate) OR 1/2c rice/gravy, chix, 1/2c green beans or 1/2c peas OR gumbo (shrimp, sausage), rice  Meal Plan 24 Hour Recall - Dinner: 7pm 2 air fried chix, canned greens beans/potatoes, water  Meal Plan 24 Hour Recall - Beverage: water, warren milk 8oz once daily  Who shops/cooks?:   Patient can identify foods that impact blood sugar.: yes  Nutrition/Healthy Eating Skills Assessment Completed:: Yes  Assessment indicates:: Adequate understanding  Area of need?: Yes    Physical  Activity/Exercise  Patient's daily activity level::  (Pt reports attending PT twice weekly and obtained walking pad for home use.)  Physical Activity/Exercise Skills Assessment Completed: : Yes  Assessment indicates:: Instruction Needed  Area of need?: Yes    Home Blood Glucose Monitoring  Patient states that blood sugar is checked at home daily.: no (Pt requests education on meter today.)  Home Blood Glucose Monitoring Skills Assessment Completed: : Yes  Assessment indicates:: Instruction Needed  Area of need?: Yes    Acute Complications  Have you ever had hypoglycemia (low BG 70 or less)?: no  Do you know the symptoms of low blood sugar and how to treat?: no  Have you ever had hyperglycemia (high  or more)?: no   Do you know the symptoms of high blood sugar and how to treat: no  Acute Complications Skills Assessment Completed: : Yes  Assessment indicates:: Instruction Needed, Knowledge deficit  Area of need?: Yes    Assessment Summary and Plan  Based on today's diabetes care assessment, the following areas of need were identified:      Areas of Need   Medications  Yes - Encouraged follow-up with Dr. Scott on Mounjaro Rx and continued medical mgmt.    Nutrition/Healthy Eating  Yes - see care plan   Physical Activity/Exercise  Yes - Encouraged continued PT; will assist with additional goals setting at follow-up. Pt needs to improve further with meal planning goals.    Home Blood Glucose Monitoring Yes- see care plan   Acute Complications Yes  Discussed prevention, identification signs/symptoms and treatment of hyperglycemia and hypoglycemia and when to contact clinic.       Today's interventions were provided through individual discussion, instruction, and written materials were provided.    Patient verbalized understanding of instruction and written materials.  Pt was able to return back demonstration of instructions today. Patient understood key points, needs reinforcement and further instruction.      Diabetes Self-Management Care Plan:  Today's Diabetes Self-Management Care Plan was developed with Kelsey's input. Kelsey has agreed to work toward the following goal(s) to improve his/her overall diabetes control.      Care Plan: Diabetes Management   Updates made since 9/2/2024 12:00 AM        Problem: Healthy Eating         Goal: Eat 3 meals daily with 30-45g/2-3 servings of Carbohydrate per meal.    Start Date: 9/3/2024   Expected End Date: 8/13/2025   This Visit's Progress: On track   Priority: High   Barriers: Other (comments)   Note:    Encouraged progress. Pt reports forgetting to eat second meal daily. Reviewed role & application of MR perkins and brands to try. She agrees to drink MR shake with am medications, which she sets phone alert to prompt consistency.       Task: Reviewed the sources and role of Carbohydrate, Protein, and Fat and how each nutrient impacts blood sugar. Completed 9/3/2024        Task: Discussed strategies for choosing healthier menu options when dining out. Completed 9/3/2024        Task: Review the importance of balancing carbohydrates with each meal using portion control techniques to count servings of carbohydrate and label reading to identify serving size and amount of total carbs per serving. Completed 9/3/2024        Task: Provided Sample plate method and reviewed the use of the plate to estimate amounts of carbohydrate per meal. Completed 9/3/2024        Problem: Blood Glucose Self-Monitoring         Goal: Patient agrees to check and record blood sugars 1 times per day (fst, ac or 2hr pp).    Start Date: 10/2/2024   Expected End Date: 8/13/2025   Priority: Medium   Barriers: No Barriers Identified        Task: Reviewed the importance of self-monitoring blood glucose and keeping logs. Completed 10/2/2024        Task: Instructed on how to self-monitor blood glucose using a home glucometer, how to properly dispose of used strips and lancets after use, and how to  appropriately store meter and supplies. Completed 10/2/2024        Task: Reviewed appropriate timing and frequency to SMBG, education on parameters on when to notify provider and advised patient to bring logs to all appts with PCP/Endocrinologist/Diabetes Care Specialist. Completed 10/2/2024        Task: Discussed ways to minimize pain when monitoring blood glucose. Completed 10/2/2024          Follow Up Plan   Follow up in about 6 weeks (around 11/13/2024).  -review BG logs  -eval goals    Today's care plan and follow up schedule was discussed with patient.  Kelsey verbalized understanding of the care plan, goals, and agrees to follow up plan.        The patient was encouraged to communicate with his/her health care provider/physician and care team regarding his/her condition(s) and treatment.  I provided the patient with my contact information today and encouraged to contact me via phone or [facility] Patient Portal as needed.     Length of Visit   Total Time: 30 Minutes

## 2024-11-04 ENCOUNTER — PATIENT OUTREACH (OUTPATIENT)
Dept: ADMINISTRATIVE | Facility: HOSPITAL | Age: 69
End: 2024-11-04
Payer: MEDICARE

## 2024-11-04 ENCOUNTER — PATIENT MESSAGE (OUTPATIENT)
Dept: ADMINISTRATIVE | Facility: OTHER | Age: 69
End: 2024-11-04
Payer: MEDICARE

## 2024-11-04 NOTE — PROGRESS NOTES
Lab visit report: Patient has upcoming lab appointment on 11/7/24 for recheck of diabetic labs.

## 2024-11-06 ENCOUNTER — CLINICAL SUPPORT (OUTPATIENT)
Dept: DIABETES | Facility: CLINIC | Age: 69
End: 2024-11-06
Payer: MEDICARE

## 2024-11-06 VITALS — WEIGHT: 221.81 LBS | BODY MASS INDEX: 40.56 KG/M2

## 2024-11-06 DIAGNOSIS — E11.9 TYPE 2 DIABETES MELLITUS WITHOUT COMPLICATION, WITHOUT LONG-TERM CURRENT USE OF INSULIN: Primary | ICD-10-CM

## 2024-11-06 PROCEDURE — G0108 DIAB MANAGE TRN  PER INDIV: HCPCS | Mod: PBBFAC | Performed by: DIETITIAN, REGISTERED

## 2024-11-06 PROCEDURE — 99999PBSHW PR PBB SHADOW TECHNICAL ONLY FILED TO HB: Mod: PBBFAC,,,

## 2024-11-06 PROCEDURE — 99213 OFFICE O/P EST LOW 20 MIN: CPT | Mod: PBBFAC | Performed by: DIETITIAN, REGISTERED

## 2024-11-06 PROCEDURE — 99999 PR PBB SHADOW E&M-EST. PATIENT-LVL III: CPT | Mod: PBBFAC,,, | Performed by: DIETITIAN, REGISTERED

## 2024-11-06 NOTE — PROGRESS NOTES
Diabetes Care Specialist Progress Note  Author: Anabel Grover RD, CDE  Date: 11/6/2024    Intake    Program Intake  Reason for Diabetes Program Visit:: Intervention (DM referral 8/13/24 Harris Scott MD)  Type of Intervention:: Individual  Education: Self-Management Skill Review  Current diabetes risk level:: high  In the last month, have you used the ER or been admitted to the hospital: No    Current Diabetes Treatment: DM Injectables, Diet/Exercise (Mounjaro 7.5 mg weekly.)    Continuous Glucose Monitoring  Patient has CGM: No    Lab Results   Component Value Date    HGBA1C 6.3 (H) 04/05/2024       Weight: 100.6 kg (221 lb 12.5 oz)       Body mass index is 40.56 kg/m².    Lifestyle Coping Support & Clinical    Problem Review  Active Comorbidities:  (HTN, HLD, CKD3, Obesity by BMI, Mental ds)    Diabetes Self-Management Skills Assessment    Medication Skills Assessment  Patient is able to identify current diabetes medications, dosages, and appropriate timing of medications.: yes  Patient reports problems or concerns with current medication regimen.: no  Medication Skills Assessment Completed:: Yes  Assessment indicates:: Adequate understanding  Area of need?: No    Nutrition/Healthy Eating  Meal Plan 24 Hour Recall - Breakfast: 10a raisin bran 1.5-2c, 2% milk -fairlife warren milk 8oz  Meal Plan 24 Hour Recall - Lunch: 4p shrimp/sausage gumbo, rice 1/3c  Meal Plan 24 Hour Recall - Dinner: 8p (2) air fried chix wings, 1c green beans, 1c peas  Meal Plan 24 Hour Recall - Snack: 2 boiled eggs - using Glucerna shake ~2d/wk  Meal Plan 24 Hour Recall - Beverage: water, fairlife warren milk 8oz, diet  Patient can identify foods that impact blood sugar.: yes  Challenges to healthy eating::  (Pt improving meal patterns w/ avg ~2 daily. Excess carb from cereal type. Pt reports using higher fiber cereal due to recent constipation.)  Nutrition/Healthy Eating Skills Assessment Completed:: Yes  Assessment indicates::  Instruction Needed  Area of need?: Yes    Physical Activity/Exercise  Patient's daily activity level::  (No exercise past 2 weeks due to left foot issue; she did informed ortho provider. Prior, attending PT twice weekly for neck, knee; pt has walking pad for home use.)  Physical Activity/Exercise Skills Assessment Completed: : Yes  Assessment indicates:: Adequate understanding  Area of need?: No    Home Blood Glucose Monitoring  Patient states that blood sugar is checked at home daily.: no (Pt declines testing. She has meter/supplies at home and was trained at last visit. She wants to focus effort on healthy lifestyle changes.)  Home Blood Glucose Monitoring Skills Assessment Completed: : Yes  Assessment indicates:: Adequate understanding  Area of need?: Yes    Assessment Summary and Plan  Based on today's diabetes care assessment, the following areas of need were identified:      Identified Areas of Need      Medication/Current Diabetes Treatment: No   Nutrition/Healthy Eating: Yes - see care plan   Physical Activity/Exercise: No    Home Blood Glucose Monitoring: Yes - see care plan     Today's interventions were provided through individual discussion, instruction, and written materials were provided.    Patient verbalized understanding of instruction and written materials.  Pt was able to return back demonstration of instructions today. Patient understood key points, needs reinforcement and further instruction.     Diabetes Self-Management Care Plan:  Today's Diabetes Self-Management Care Plan was developed with Kelsey's input. Kelsey has agreed to work toward the following goal(s) to improve his/her overall diabetes control.      Care Plan: Diabetes Management   Updates made since 11/7/2023 12:00 AM        Problem: Healthy Eating         Goal: Eat 3 meals daily with 30-45g/2-3 servings of Carbohydrate per meal.    Start Date: 9/3/2024   Expected End Date: 8/13/2025   This Visit's Progress: On track   Recent  Progress: On track   Priority: High   Barriers: Other (comments)   Note:    Encouraged progress. Reviewed carb portion targets and excess coming from cereal type. Pt agrees to switch raisin bran to rice krispies and start Metimucil for additional fiber. Encouraged her to maintain good hydration. Discussed role meal spacing on BG control,  overall health. To further improve meal patterns, pt agrees to increase Glucerna as meal replacement 5d/wk.      Problem: Blood Glucose Self-Monitoring         Goal: Patient agrees to check and record blood sugars 1 times per day (fst, ac or 2hr pp). Completed 11/6/2024   Start Date: 10/2/2024   Expected End Date: 8/13/2025   This Visit's Progress: Deferred   Priority: Medium   Barriers: Other (comments)   Note:    Pt agrees to use for identifying/treating acute complications.      Problem: Acute Complications         Goal: Patient agrees to identify and manage signs and symptoms of high/low blood sugar (hyper/hypoglycemia) by keeping a log of events and using proper treatment.    Start Date: 11/6/2024   Expected End Date: 8/13/2025   Priority: Medium   Barriers: No Barriers Identified        Task: Reviewed proper treatment of hypoglycemia with the rule of 15--patient to eat 15g simple carbohydrate (4 glucose tablets, 1 glucose gel, 5 pieces hard candy, ½ cup fruit juice, ½ can regular soda, etc) and wait 15 minutes and recheck home glucose. Completed 11/6/2024        Task: Reviewed common causes and precautions to help prevent hyper/hypoglycemic events. Completed 11/6/2024        Task: Reviewed signs and symptoms of hyper/hypoglycemia, what range is considered to be hyper/hypoglycemia, and when to seek further medical attention. Completed 11/6/2024          Follow Up Plan  Follow up in about 6 weeks (around 12/18/2024).  -review A1C updates   -review BG logs if avail  -eval goals    Today's care plan and follow up schedule was discussed with patient.  Kelsey verbalized  understanding of the care plan, goals, and agrees to follow up plan.        The patient was encouraged to communicate with his/her health care provider/physician and care team regarding his/her condition(s) and treatment.  I provided the patient with my contact information today and encouraged to contact me via phone or [facility] Patient Portal as needed.     Length of Visit   Total Time: 60 Minutes

## 2024-11-07 ENCOUNTER — LAB VISIT (OUTPATIENT)
Dept: LAB | Facility: HOSPITAL | Age: 69
End: 2024-11-07
Attending: FAMILY MEDICINE
Payer: MEDICARE

## 2024-11-07 DIAGNOSIS — E11.9 TYPE 2 DIABETES MELLITUS WITHOUT COMPLICATION, WITHOUT LONG-TERM CURRENT USE OF INSULIN: ICD-10-CM

## 2024-11-07 LAB
ALBUMIN SERPL BCP-MCNC: 3.5 G/DL (ref 3.5–5.2)
ALP SERPL-CCNC: 96 U/L (ref 40–150)
ALT SERPL W/O P-5'-P-CCNC: 18 U/L (ref 10–44)
ANION GAP SERPL CALC-SCNC: 11 MMOL/L (ref 8–16)
AST SERPL-CCNC: 15 U/L (ref 10–40)
BILIRUB SERPL-MCNC: 0.5 MG/DL (ref 0.1–1)
BUN SERPL-MCNC: 23 MG/DL (ref 8–23)
CALCIUM SERPL-MCNC: 9.7 MG/DL (ref 8.7–10.5)
CHLORIDE SERPL-SCNC: 102 MMOL/L (ref 95–110)
CHOLEST SERPL-MCNC: 165 MG/DL (ref 120–199)
CHOLEST/HDLC SERPL: 5.2 {RATIO} (ref 2–5)
CO2 SERPL-SCNC: 25 MMOL/L (ref 23–29)
CREAT SERPL-MCNC: 1 MG/DL (ref 0.5–1.4)
EST. GFR  (NO RACE VARIABLE): >60 ML/MIN/1.73 M^2
ESTIMATED AVG GLUCOSE: 100 MG/DL (ref 68–131)
GLUCOSE SERPL-MCNC: 90 MG/DL (ref 70–110)
HBA1C MFR BLD: 5.1 % (ref 4–5.6)
HDLC SERPL-MCNC: 32 MG/DL (ref 40–75)
HDLC SERPL: 19.4 % (ref 20–50)
LDLC SERPL CALC-MCNC: 108.4 MG/DL (ref 63–159)
NONHDLC SERPL-MCNC: 133 MG/DL
POTASSIUM SERPL-SCNC: 3.5 MMOL/L (ref 3.5–5.1)
PROT SERPL-MCNC: 7.6 G/DL (ref 6–8.4)
SODIUM SERPL-SCNC: 138 MMOL/L (ref 136–145)
TRIGL SERPL-MCNC: 123 MG/DL (ref 30–150)

## 2024-11-07 PROCEDURE — 36415 COLL VENOUS BLD VENIPUNCTURE: CPT | Performed by: FAMILY MEDICINE

## 2024-11-07 PROCEDURE — 80061 LIPID PANEL: CPT | Performed by: FAMILY MEDICINE

## 2024-11-07 PROCEDURE — 83036 HEMOGLOBIN GLYCOSYLATED A1C: CPT | Performed by: FAMILY MEDICINE

## 2024-11-07 PROCEDURE — 80053 COMPREHEN METABOLIC PANEL: CPT | Performed by: FAMILY MEDICINE

## 2024-11-14 ENCOUNTER — OFFICE VISIT (OUTPATIENT)
Dept: INTERNAL MEDICINE | Facility: CLINIC | Age: 69
End: 2024-11-14
Payer: MEDICARE

## 2024-11-14 VITALS
SYSTOLIC BLOOD PRESSURE: 122 MMHG | TEMPERATURE: 98 F | HEIGHT: 62 IN | DIASTOLIC BLOOD PRESSURE: 88 MMHG | WEIGHT: 224.63 LBS | BODY MASS INDEX: 41.34 KG/M2

## 2024-11-14 DIAGNOSIS — E66.01 MORBID OBESITY: ICD-10-CM

## 2024-11-14 DIAGNOSIS — E11.9 TYPE 2 DIABETES MELLITUS WITHOUT COMPLICATION, WITHOUT LONG-TERM CURRENT USE OF INSULIN: Primary | ICD-10-CM

## 2024-11-14 DIAGNOSIS — I10 PRIMARY HYPERTENSION: ICD-10-CM

## 2024-11-14 PROCEDURE — G2211 COMPLEX E/M VISIT ADD ON: HCPCS | Mod: S$PBB,,, | Performed by: FAMILY MEDICINE

## 2024-11-14 PROCEDURE — 99214 OFFICE O/P EST MOD 30 MIN: CPT | Mod: S$PBB,,, | Performed by: FAMILY MEDICINE

## 2024-11-14 PROCEDURE — 99213 OFFICE O/P EST LOW 20 MIN: CPT | Mod: PBBFAC | Performed by: FAMILY MEDICINE

## 2024-11-14 PROCEDURE — 99999 PR PBB SHADOW E&M-EST. PATIENT-LVL III: CPT | Mod: PBBFAC,,, | Performed by: FAMILY MEDICINE

## 2024-11-14 NOTE — PROGRESS NOTES
Chief Complaint: Follow-up (3m f/u)    History of Present Illness    CHIEF COMPLAINT:  Ms. Terrell presents today for follow-up on diabetes management and overall health.    DIABETES MANAGEMENT:  She reports significant improvement in A1C, which has decreased to 5.1. She has been seeing a dietician and actively changing her diet, noting these efforts have made a noticeable difference in her health. She does not believe she needs a continuous glucose monitor at this time, as she plans to continue with her current successful management approach.    MEDICATIONS:  She is taking Mounjaro (tirzepatide) for weight management and inquires about the highest dosage, indicating interest in potentially increasing her current dose. She continues cholesterol medication as prescribed. Her blood pressure medication dose was reduced in January.    WEIGHT MANAGEMENT:  She reports difficulty losing weight despite previous success, including a 40-pound reduction since 1/24 . She is currently experiencing a plateau in her weight loss efforts and expresses a desire to continue losing more weight.    PHYSICAL ACTIVITY:  She participates in physical therapy for knee arthritis, engaging in exercises at the facility and at home. She uses a treadmill and performs strength training with kettlebells as instructed by her physical therapist. She reports these activities have been beneficial, helping her feel better overall.    LAB RESULTS:  She reports awareness of recent lab results. Her potassium levels, previously low, have now normalized. She expresses concern about her cholesterol levels, noting that her overall cholesterol could be lower despite improvements in other areas.    VACCINATIONS:  She has received the RSV shot, the first dose of the shingles vaccine, and the flu vaccine. She has not yet received the updated COVID-19 vaccine released in September.    BREAST HEALTH:  She reports resolution of breast pain following use of evening  primrose oil and vitamin E supplements. The pain, previously located under and across her breasts, has completely resolved. She no longer requires follow-up with the breast specialist.    GASTROINTESTINAL HEALTH:  She denies any GI issues, chest pain, or other related problems.      Prior breast pain rsolved.      ROS:  Cardiovascular: no chest pain  Chest: no shortness of breath  Abd: no abd pain  Remainder review of systems negative           Objective:   Gen nad  Cvrrr    Assessment:       1. Type 2 diabetes mellitus without complication, without long-term current use of insulin    2. Morbid obesity    3. Primary hypertension        Plan:   Assessment & Plan    Assessed A1C improvement (down to 5.1) and cholesterol levels (overall 165, LDL unchanged but improved from baseline)  Evaluated weight loss progress (40 lbs since January, 10 lbs since August)  Considered increasing Mounjaro dosage from 7.5mg to 10mg for potential further weight loss  Anticipated possible need to adjust blood pressure medication if lightheadedness occurs with increased Mounjaro dosage  Determined current cholesterol levels acceptable; no medication changes needed  Noted resolution of breast pain with evening primrose oil and vitamin E  Reviewed vaccination status and discussed potential need for COVID booster    PLAN SUMMARY:  Conduct lab work including A1C and cholesterol before next appointment  Continue cholesterol medication at current dosage  Increase Mounjaro from 7.5mg to 10mg  Follow up in 3-4 months (February with Gretel or April with current provider)    TYPE 2 DIABETES MELLITUS:  Explained potential for further weight loss with increased Mounjaro dosage.  Increased Mounjaro from 7.5mg to 10mg.  Conduct lab work including A1C and cholesterol prior to next appointment.    HYPERTENSION:  Discussed possibility of needing less blood pressure medication as weight decreases.  Ms. Terrell to monitor for lightheadedness when standing,  which may indicate need for blood pressure medication adjustment.    HYPERLIPIDEMIA:  Continued cholesterol medication at current dosage.    DIETARY COUNSELING:  Ms. Terrell to continue current diet modifications.    EXERCISE COUNSELING:  Reviewed benefits of physical activity for blood sugar, blood pressure management, and mental health.  Ms. Terrell to continue current exercise regimen.    FOLLOW-UP:  Follow up in 3-4 months (February with Gretel or April with current provider).          Type 2 diabetes mellitus without complication, without long-term current use of insulin  -     Hemoglobin A1C; Future; Expected date: 03/14/2025  -     Microalbumin/Creatinine Ratio, Urine; Future; Expected date: 03/14/2025    Morbid obesity    Primary hypertension    Other orders  -     tirzepatide 10 mg/0.5 mL PnIj; Inject 10 mg into the skin every 7 days.  Dispense: 4 Pen; Refill: 11

## 2024-11-27 NOTE — TELEPHONE ENCOUNTER
No care due was identified.  [facility] Embedded Care Due Messages. Reference number: [id number].   11/27/2024 7:25:39 AM CST

## 2024-12-15 NOTE — TELEPHONE ENCOUNTER
No care due was identified.  [facility] Embedded Care Due Messages. Reference number: [id number].   12/15/2024 3:15:07 PM CST

## 2024-12-17 ENCOUNTER — CLINICAL SUPPORT (OUTPATIENT)
Dept: DIABETES | Facility: CLINIC | Age: 69
End: 2024-12-17
Payer: MEDICARE

## 2024-12-17 VITALS — BODY MASS INDEX: 40.28 KG/M2 | WEIGHT: 220.25 LBS

## 2024-12-17 DIAGNOSIS — E11.9 TYPE 2 DIABETES MELLITUS WITHOUT COMPLICATION, WITHOUT LONG-TERM CURRENT USE OF INSULIN: Primary | ICD-10-CM

## 2024-12-17 PROCEDURE — G0108 DIAB MANAGE TRN  PER INDIV: HCPCS | Mod: PBBFAC | Performed by: DIETITIAN, REGISTERED

## 2024-12-17 PROCEDURE — 99213 OFFICE O/P EST LOW 20 MIN: CPT | Mod: PBBFAC | Performed by: DIETITIAN, REGISTERED

## 2024-12-17 PROCEDURE — 99999 PR PBB SHADOW E&M-EST. PATIENT-LVL III: CPT | Mod: PBBFAC,,, | Performed by: DIETITIAN, REGISTERED

## 2024-12-17 PROCEDURE — 99999PBSHW PR PBB SHADOW TECHNICAL ONLY FILED TO HB: Mod: PBBFAC,,,

## 2024-12-17 RX ORDER — ATORVASTATIN CALCIUM 40 MG/1
40 TABLET, FILM COATED ORAL DAILY
Qty: 90 TABLET | Refills: 3 | Status: SHIPPED | OUTPATIENT
Start: 2024-12-17

## 2024-12-17 NOTE — PROGRESS NOTES
Diabetes Care Specialist Progress Note  Author: Anabel Grover RD, CDE  Date: 12/17/2024    Intake    Program Intake  Reason for Diabetes Program Visit:: Post Program Follow-Up (DM referral 8/13/24 Harris Scott MD)  Type of Intervention:: Individual  Education: Self-Management Skill Review  Current diabetes risk level:: high  In the last month, have you used the ER or been admitted to the hospital: No    Current Diabetes Treatment: DM Injectables, Diet/Exercise (Mounjaro 10mg weekly.)    Continuous Glucose Monitoring  Patient has CGM: No    Lab Results   Component Value Date    HGBA1C 5.1 11/07/2024      Latest Ref Rng 4/5/2024   Diabetes Management     HbA1c 4.0 - 5.6 % 6.3 (H)        Latest Ref Rng 12/27/2023   Diabetes Management     HbA1c 4.0 - 5.6 % 10.6 (H)       Weight: 99.9 kg (220 lb 3.8 oz)       Body mass index is 40.28 kg/m².    Lifestyle Coping Support & Clinical    Problem Review  Active Comorbidities:  (HTN, HLD, CKD3, BMI 40.28, Mental ds)    Diabetes Self-Management Skills Assessment    Medication Skills Assessment  Patient is able to identify current diabetes medications, dosages, and appropriate timing of medications.: yes  Patient reports problems or concerns with current medication regimen.: no  Medication Skills Assessment Completed:: Yes  Assessment indicates:: Adequate understanding  Area of need?: No    Nutrition/Healthy Eating  Meal Plan 24 Hour Recall - Breakfast: cereal- 2c sugar smacks, 2% fairlife milk OR Glucerna  Meal Plan 24 Hour Recall - Lunch: Glucerna OR Greek chix, salad, nish bread 1/2-1 OR boiled shrimp, bkd potato w/ butter, sour cream  Meal Plan 24 Hour Recall - Dinner: Glucerna  Meal Plan 24 Hour Recall - Snack: fruit OR granola bar OR Chinese cookie OR daija nuvo 1pc  Meal Plan 24 Hour Recall - Beverage: water, diet  Who shops/cooks?:   Patient can identify foods that impact blood sugar.: yes  Challenges to healthy eating::  (Pt states eating 2-3meals daily  and using Glucerna instead of meal skipping. Pt states understanding of how much carbohydrate is healthy for her. Pt request Nutrition Fact label review.)  Nutrition/Healthy Eating Skills Assessment Completed:: Yes  Assessment indicates:: Instruction Needed  Area of need?: Yes    Physical Activity/Exercise  Patient's daily activity level::  (PT 2d/wk for 60min (20min on treadmill). Pt states performing PT exercises at home 2-3d/wk; however, pt cannot describe time.)  Patient can identify forms of physical activity.: yes  Physical Activity/Exercise Skills Assessment Completed: : Yes  Assessment indicates:: Adequate understanding  Area of need?: No    Home Blood Glucose Monitoring  Patient states that blood sugar is checked at home daily.: no (Pt declines testing. She has meter/supplies at home and was trained at previous visit. 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 She is aware of tips to lessen fingerstick pain.)  Home Blood Glucose Monitoring Skills Assessment Completed: : Yes  Assessment indicates:: Adequate understanding  Area of need?: Yes    Acute Complications  Have you ever had hypoglycemia (low BG 70 or less)?: no (pt denies symptoms)  Have you ever had hyperglycemia (high  or more)?: no (pt denies symptoms)  Acute Complications Skills Assessment Completed: : Yes  Assessment indicates:: Adequate understanding  Area of need?: No    Assessment Summary and Plan  Based on today's diabetes care assessment, the following areas of need were identified:      Identified Areas of Need      Medication/Current Diabetes Treatment: No   Nutrition/Healthy Eating: Yes - see care plan   Physical Activity/Exercise: No    Home Blood Glucose Monitoring: Yes - see care plan   Acute Complications: No      Today's interventions were provided through individual discussion, instruction, and written materials were provided.    Patient verbalized understanding of instruction and written materials.  Pt was able to return back demonstration of  instructions today. Patient understood key points, needs reinforcement and further instruction.     Diabetes Self-Management Care Plan:  Today's Diabetes Self-Management Care Plan was developed with Kelsey's input. Kelsey has agreed to work toward the following goal(s) to improve his/her overall diabetes control.      Care Plan: Diabetes Management   Updates made since 12/18/2023 12:00 AM        Problem: Healthy Eating         Goal: Eat 3 meals daily with 30-45g/2-3 servings of Carbohydrate per meal. Completed 12/17/2024   Start Date: 9/3/2024   Expected End Date: 8/13/2025   This Visit's Progress: Met   Recent Progress: On track   Priority: High   Barriers: No Barriers Identified   Note:    Pt is meeting goal to her best ability. Encouraged progress and consistency of meal consistency, variety and supplementing w/ Glucerna as needed. Encouraged consistency of water intake for good hydration.     Review Nutrition Facts label with emphasis on total carb target per meal. Also reviewed carb portions and provided food list.       Problem: Blood Glucose Self-Monitoring         Goal: Patient agrees to check and record blood sugars 1 times per day (fst, ac or 2hr pp). Completed 11/6/2024   Start Date: 10/2/2024   Expected End Date: 8/13/2025   This Visit's Progress: Deferred   Priority: Medium   Barriers: Other (comments)   Note:    Pt agrees to use for identifying/treating acute complications.      Problem: Acute Complications         Goal: Patient agrees to identify and manage signs and symptoms of high/low blood sugar (hyper/hypoglycemia) by keeping a log of events and using proper treatment. Completed 12/17/2024   Start Date: 11/6/2024   Expected End Date: 8/13/2025   This Visit's Progress: Met   Priority: Medium   Barriers: No Barriers Identified   Note:    Pt denies symptoms or episodes. Again, reviewed tips for decreasing fingerstick pain. Pt agrees to follow-up with primary care for medical mgmt.       Follow Up  Plan  Follow up as referred.    Today's care plan and follow up schedule was discussed with patient.  Kelsey verbalized understanding of the care plan, goals, and agrees to follow up plan.        The patient was encouraged to communicate with his/her health care provider/physician and care team regarding his/her condition(s) and treatment.  I provided the patient with my contact information today and encouraged to contact me via phone or [facility] as needed.     Length of Visit   Total Time: 30 Minutes

## 2024-12-17 NOTE — LETTER
December 17, 2024      Harris Scott MD  [id number] [facility] [facility]  [place] [address]         Patient: Kelsey Terrell   MR Number: [id number]   YOB: 1955   Date of Visit: 12/17/2024       Dear Dr. Scott:    Thank you for referring Kelsey for diabetes self-management education and support services. She has completed all components of our Diabetes Management Program. Below is a summary of her clinical outcomes and goal progress.    Patient Outcomes:    A1c Status:   Lab Results   Component Value Date    HGBA1C 5.1 11/07/2024    HGBA1C 6.3 (H) 04/05/2024      Latest Ref Rng 12/27/2023   Diabetes Management     HbA1c 4.0 - 5.6 % 10.6 (H)       Care Plan: Diabetes Management   Updates made since 12/18/2023 12:00 AM        Problem: Healthy Eating         Goal: Eat 3 meals daily with 30-45g/2-3 servings of Carbohydrate per meal. Completed 12/17/2024   Start Date: 9/3/2024   Expected End Date: 8/13/2025   This Visit's Progress: Met   Recent Progress: On track   Priority: High   Barriers: No Barriers Identified   Note:    Pt is meeting goal to her best ability. Encouraged progress and consistency of meal consistency, variety and supplementing w/ Glucerna as needed. Encouraged consistency of water intake for good hydration.     Review Nutrition Facts label with emphasis on total carb target per meal. Also reviewed carb portions and provided food list.         Task: Reviewed the sources and role of Carbohydrate, Protein, and Fat and how each nutrient impacts blood sugar. Completed 9/3/2024        Task: Discussed strategies for choosing healthier menu options when dining out. Completed 9/3/2024        Task: Review the importance of balancing carbohydrates with each meal using portion control techniques to count servings of carbohydrate and label reading to identify serving size and amount of total carbs per serving. Completed 9/3/2024        Task: Provided Sample plate method and reviewed the use of the  plate to estimate amounts of carbohydrate per meal. Completed 9/3/2024        Problem: Blood Glucose Self-Monitoring         Goal: Patient agrees to check and record blood sugars 1 times per day (fst, ac or 2hr pp). Completed 11/6/2024   Start Date: 10/2/2024   Expected End Date: 8/13/2025   This Visit's Progress: Deferred   Priority: Medium   Barriers: Other (comments)   Note:    Pt agrees to use for identifying/treating acute complications.        Task: Reviewed the importance of self-monitoring blood glucose and keeping logs. Completed 10/2/2024        Task: Instructed on how to self-monitor blood glucose using a home glucometer, how to properly dispose of used strips and lancets after use, and how to appropriately store meter and supplies. Completed 10/2/2024        Task: Reviewed appropriate timing and frequency to SMBG, education on parameters on when to notify provider and advised patient to bring logs to all appts with PCP/Endocrinologist/Diabetes Care Specialist. Completed 10/2/2024        Task: Discussed ways to minimize pain when monitoring blood glucose. Completed 10/2/2024        Problem: Acute Complications         Goal: Patient agrees to identify and manage signs and symptoms of high/low blood sugar (hyper/hypoglycemia) by keeping a log of events and using proper treatment. Completed 12/17/2024   Start Date: 11/6/2024   Expected End Date: 8/13/2025   This Visit's Progress: Met   Priority: Medium   Barriers: No Barriers Identified   Note:    Pt denies symptoms or episodes. Again, reviewed tips for decreasing fingerstick pain. Pt agrees to follow-up with primary care for medical mgmt.         Task: Reviewed proper treatment of hypoglycemia with the rule of 15--patient to eat 15g simple carbohydrate (4 glucose tablets, 1 glucose gel, 5 pieces hard candy, ½ cup fruit juice, ½ can regular soda, etc) and wait 15 minutes and recheck home glucose. Completed 11/6/2024        Task: Reviewed common causes and  precautions to help prevent hyper/hypoglycemic events. Completed 11/6/2024        Task: Reviewed signs and symptoms of hyper/hypoglycemia, what range is considered to be hyper/hypoglycemia, and when to seek further medical attention. Completed 11/6/2024          Follow up:   Kelsey to attend medical appointments as scheduled  Kelsey to update you on her DM education progress as needed      If you have questions, please do not hesitate to call me. I look forward to providing additional education and support as needed.    Sincerely,    Anabel Grover RD, CDE  Diabetes Care and

## 2024-12-17 NOTE — TELEPHONE ENCOUNTER
Refill Routing Note   Medication(s) are not appropriate for processing by [facility] for the following reason(s):        Due for refill >6 months ago    [facility] action(s):  Defer               Appointments  past 12m or future 3m with PCP    Date Provider   Last Visit   11/14/2024 Harris Scott MD   Next Visit   4/8/2025 Harris Scott MD   ED visits in past 90 days: 0        Note composed:8:53 PM 12/16/2024

## 2025-01-14 DIAGNOSIS — Z00.00 ENCOUNTER FOR MEDICARE ANNUAL WELLNESS EXAM: ICD-10-CM

## 2025-02-22 DIAGNOSIS — I10 PRIMARY HYPERTENSION: ICD-10-CM

## 2025-02-25 RX ORDER — OLMESARTAN MEDOXOMIL, AMLODIPINE AND HYDROCHLOROTHIAZIDE TABLET 20/5/12.5 MG 20; 5; 12.5 MG/1; MG/1; MG/1
1 TABLET ORAL DAILY
Qty: 30 TABLET | Refills: 0 | Status: SHIPPED | OUTPATIENT
Start: 2025-02-25 | End: 2026-02-25

## 2025-03-24 DIAGNOSIS — I10 PRIMARY HYPERTENSION: ICD-10-CM

## 2025-03-25 ENCOUNTER — APPOINTMENT (OUTPATIENT)
Dept: LAB | Facility: HOSPITAL | Age: 70
End: 2025-03-25
Payer: MEDICARE

## 2025-03-25 RX ORDER — OLMESARTAN MEDOXOMIL, AMLODIPINE AND HYDROCHLOROTHIAZIDE TABLET 20/5/12.5 MG 20; 5; 12.5 MG/1; MG/1; MG/1
1 TABLET ORAL DAILY
Qty: 30 TABLET | Refills: 0 | Status: SHIPPED | OUTPATIENT
Start: 2025-03-25 | End: 2026-03-25

## 2025-04-08 ENCOUNTER — OFFICE VISIT (OUTPATIENT)
Dept: INTERNAL MEDICINE | Facility: CLINIC | Age: 70
End: 2025-04-08
Payer: MEDICARE

## 2025-04-08 VITALS
WEIGHT: 224.63 LBS | HEART RATE: 94 BPM | TEMPERATURE: 98 F | OXYGEN SATURATION: 97 % | DIASTOLIC BLOOD PRESSURE: 64 MMHG | SYSTOLIC BLOOD PRESSURE: 110 MMHG | BODY MASS INDEX: 41.34 KG/M2 | HEIGHT: 62 IN

## 2025-04-08 DIAGNOSIS — E66.01 MORBID OBESITY: ICD-10-CM

## 2025-04-08 DIAGNOSIS — E11.9 TYPE 2 DIABETES MELLITUS WITHOUT COMPLICATION, WITHOUT LONG-TERM CURRENT USE OF INSULIN: Primary | ICD-10-CM

## 2025-04-08 DIAGNOSIS — E78.2 MIXED HYPERLIPIDEMIA: ICD-10-CM

## 2025-04-08 DIAGNOSIS — Z12.11 SCREEN FOR COLON CANCER: ICD-10-CM

## 2025-04-08 DIAGNOSIS — Z12.31 ENCOUNTER FOR SCREENING MAMMOGRAM FOR MALIGNANT NEOPLASM OF BREAST: ICD-10-CM

## 2025-04-08 PROBLEM — N18.31 CHRONIC KIDNEY DISEASE, STAGE 3A: Status: RESOLVED | Noted: 2024-08-13 | Resolved: 2025-04-08

## 2025-04-08 PROCEDURE — 99215 OFFICE O/P EST HI 40 MIN: CPT | Mod: PBBFAC | Performed by: FAMILY MEDICINE

## 2025-04-08 PROCEDURE — G2211 COMPLEX E/M VISIT ADD ON: HCPCS | Mod: S$PBB,,, | Performed by: FAMILY MEDICINE

## 2025-04-08 PROCEDURE — 99214 OFFICE O/P EST MOD 30 MIN: CPT | Mod: S$PBB,,, | Performed by: FAMILY MEDICINE

## 2025-04-08 PROCEDURE — 99999 PR PBB SHADOW E&M-EST. PATIENT-LVL V: CPT | Mod: PBBFAC,,, | Performed by: FAMILY MEDICINE

## 2025-04-08 NOTE — PROGRESS NOTES
Subjective:      Patient ID: Kelsey Terrell is a 69 y.o. female.    Chief Complaint: Follow-up    HPI type 2 diabetes mellitus controlled on Mounjaro tolerating with subsequent weight loss stabilized    Hypertension controlled      Daughter on phone with us during her exam  Past Medical History:   Diagnosis Date    Chronic bronchitis     Chronic depression     dr montenegro    Difficult intubation     History of Bell's palsy 1980's    Left side    Hypercholesteremia     Hypertension     dr roland also    Mitral regurgitation     Morbid obesity     Type 2 diabetes mellitus without complications       Past Surgical History:   Procedure Laterality Date    BREAST BIOPSY Left     benign-pt was 20 yrs old    closed reduction shoulder dislocation Left     HYSTERECTOMY      noncancer    OOPHORECTOMY      TONSILLECTOMY      TUBAL LIGATION        Social History[1]   Family History   Problem Relation Name Age of Onset    Cataracts Mother Syeda     Hypertension Father Isadore Sr.     Throat cancer Father Isadore Sr.     Cervical cancer Paternal Aunt      Diabetes Daughter Lisha     Other Daughter Lisha         ganglion cyst    Cancer Daughter Milla     Other Daughter Milla         lump under armpit, removed    Thyroid cancer Daughter Milla 40        parathyroid    Clotting disorder Daughter Milla     Endometriosis Other      Liver disease Other      Glaucoma Neg Hx      Macular degeneration Neg Hx      Retinal detachment Neg Hx      Strabismus Neg Hx        Review of Systems      Cardiovascular: no chest pain  Chest: no shortness of breath  Abd: no abd pain  Remainder review of systems negative    Objective:     Physical Exam  Vitals and nursing note reviewed.   Constitutional:       Appearance: She is well-developed.   HENT:      Head: Normocephalic and atraumatic.   Neck:      Vascular: No carotid bruit.   Cardiovascular:      Rate and Rhythm: Normal rate and regular rhythm.   Pulmonary:      Effort: Pulmonary  effort is normal.      Breath sounds: Normal breath sounds.   Abdominal:      General: Bowel sounds are normal. There is no distension.      Palpations: Abdomen is soft. There is no mass.      Tenderness: There is no abdominal tenderness. There is no guarding or rebound.   Musculoskeletal:      Cervical back: Normal range of motion and neck supple.   Lymphadenopathy:      Cervical: No cervical adenopathy.   Skin:     General: Skin is warm and dry.   Neurological:      Mental Status: She is alert and oriented to person, place, and time.   Psychiatric:         Behavior: Behavior normal.         Judgment: Judgment normal.     Foot exam via podiatry  Assessment:         ICD-10-CM ICD-9-CM   1. Type 2 diabetes mellitus without complication, without long-term current use of insulin  E11.9 250.00   2. Encounter for screening mammogram for malignant neoplasm of breast  Z12.31 V76.12   3. Screen for colon cancer  Z12.11 V76.51   4. Mixed hyperlipidemia  E78.2 272.2   5. Morbid obesity  E66.01 278.01      Plan:      Lab and follow up after in 6 months     1. Type 2 diabetes mellitus without complication, without long-term current use of insulin  -     Hemoglobin A1C; Future; Expected date: 10/05/2025  -     Comprehensive Metabolic Panel; Future; Expected date: 10/05/2025  -     Lipid Panel; Future; Expected date: 10/05/2025    2. Encounter for screening mammogram for malignant neoplasm of breast  -     Mammo Digital Screening Bilat w/ Jose (XPD); Future; Expected date: 04/08/2025    3. Screen for colon cancer  -     Ambulatory referral/consult to Endo Procedure ; Future; Expected date: 04/09/2025    4. Mixed hyperlipidemia    5. Morbid obesity         At follow up consider increasing Mounjaro    Message to her eye doctor for diabetic eye exam follow-up       [1]   Social History  Socioeconomic History    Marital status:    Tobacco Use    Smoking status: Never    Smokeless tobacco: Never   Substance and Sexual  Activity    Alcohol use: Yes    Drug use: No    Sexual activity: Not Currently     Social Drivers of Health     Financial Resource Strain: Low Risk  (2/7/2024)    Overall Financial Resource Strain (CARDIA)     Difficulty of Paying Living Expenses: Not very hard   Food Insecurity: No Food Insecurity (2/7/2024)    Hunger Vital Sign     Worried About Running Out of Food in the Last Year: Never true     Ran Out of Food in the Last Year: Never true   Transportation Needs: No Transportation Needs (2/7/2024)    PRAPARE - Transportation     Lack of Transportation (Medical): No     Lack of Transportation (Non-Medical): No   Physical Activity: Inactive (2/7/2024)    Exercise Vital Sign     Days of Exercise per Week: 0 days     Minutes of Exercise per Session: 0 min   Stress: Stress Concern Present (2/7/2024)    [employer] - Occupational Stress Questionnaire     Feeling of Stress : Very much   Housing Stability: Low Risk  (2/7/2024)    Housing Stability Vital Sign     Unable to Pay for Housing in the Last Year: No     Number of Places Lived in the Last Year: 1     Unstable Housing in the Last Year: No

## 2025-04-10 ENCOUNTER — PATIENT MESSAGE (OUTPATIENT)
Dept: PREADMISSION TESTING | Facility: HOSPITAL | Age: 70
End: 2025-04-10
Payer: MEDICARE

## 2025-04-22 ENCOUNTER — HOSPITAL ENCOUNTER (OUTPATIENT)
Dept: RADIOLOGY | Facility: HOSPITAL | Age: 70
Discharge: HOME | End: 2025-04-22
Attending: FAMILY MEDICINE
Payer: MEDICARE

## 2025-04-22 VITALS — HEIGHT: 62 IN | WEIGHT: 224.63 LBS | BODY MASS INDEX: 41.34 KG/M2

## 2025-04-22 DIAGNOSIS — Z12.31 ENCOUNTER FOR SCREENING MAMMOGRAM FOR MALIGNANT NEOPLASM OF BREAST: ICD-10-CM

## 2025-04-22 PROCEDURE — 77067 SCR MAMMO BI INCL CAD: CPT | Mod: 26,,, | Performed by: RADIOLOGY

## 2025-04-22 PROCEDURE — 77063 BREAST TOMOSYNTHESIS BI: CPT | Mod: 26,,, | Performed by: RADIOLOGY

## 2025-04-22 PROCEDURE — 77063 BREAST TOMOSYNTHESIS BI: CPT | Mod: TC

## 2025-04-24 DIAGNOSIS — I10 PRIMARY HYPERTENSION: ICD-10-CM

## 2025-04-24 RX ORDER — OLMESARTAN MEDOXOMIL, AMLODIPINE AND HYDROCHLOROTHIAZIDE TABLET 20/5/12.5 MG 20; 5; 12.5 MG/1; MG/1; MG/1
1 TABLET ORAL DAILY
Qty: 30 TABLET | Refills: 0 | Status: SHIPPED | OUTPATIENT
Start: 2025-04-24 | End: 2026-04-24

## 2025-05-01 ENCOUNTER — HOSPITAL ENCOUNTER (OUTPATIENT)
Dept: PREADMISSION TESTING | Facility: HOSPITAL | Age: 70
Discharge: HOME | End: 2025-05-01
Attending: INTERNAL MEDICINE
Payer: MEDICARE

## 2025-05-01 DIAGNOSIS — Z12.11 SCREEN FOR COLON CANCER: Primary | ICD-10-CM

## 2025-05-01 RX ORDER — SODIUM, POTASSIUM,MAG SULFATES 17.5-3.13G
1 SOLUTION, RECONSTITUTED, ORAL ORAL DAILY
Qty: 1 KIT | Refills: 0 | Status: SHIPPED | OUTPATIENT
Start: 2025-05-01 | End: 2025-05-03

## 2025-05-12 ENCOUNTER — PATIENT MESSAGE (OUTPATIENT)
Dept: OPHTHALMOLOGY | Facility: CLINIC | Age: 70
End: 2025-05-12
Payer: MEDICARE

## 2025-05-20 DIAGNOSIS — I10 PRIMARY HYPERTENSION: ICD-10-CM

## 2025-05-21 RX ORDER — OLMESARTAN MEDOXOMIL, AMLODIPINE AND HYDROCHLOROTHIAZIDE TABLET 20/5/12.5 MG 20; 5; 12.5 MG/1; MG/1; MG/1
1 TABLET ORAL DAILY
Qty: 30 TABLET | Refills: 0 | Status: SHIPPED | OUTPATIENT
Start: 2025-05-21 | End: 2026-05-21

## 2025-05-27 ENCOUNTER — ANESTHESIA EVENT (OUTPATIENT)
Dept: ENDOSCOPY | Facility: HOSPITAL | Age: 70
End: 2025-05-27
Payer: MEDICARE

## 2025-05-27 NOTE — ANESTHESIA PREPROCEDURE EVALUATION
05/27/2025  Kelsey Terrell is a 69 y.o., female.  Past Medical History:   Diagnosis Date    Chronic bronchitis     Chronic depression     dr montenegro    Difficult intubation     History of Bell's palsy 1980's    Left side    Hypercholesteremia     Hypertension     dr roland also    Mitral regurgitation     Morbid obesity     Type 2 diabetes mellitus without complications            Pre-op Assessment    I have reviewed the Patient Summary Reports.     I have reviewed the Nursing Notes. I have reviewed the NPO Status.   I have reviewed the Medications.     Review of Systems  Anesthesia Hx:  No problems with previous Anesthesia  H/o difficult airway, no previous records available           Denies Family Hx of Anesthesia complications.   Personal Hx of Anesthesia complications   Difficult Intubation                  Social:  Alcohol Use, Non-Smoker       Cardiovascular:     Hypertension           hyperlipidemia                               Pulmonary:         Chronic bronchitis               Hepatic/GI:  Bowel Prep.       Taking GLP-1 Agonists            Neurological:    Neuromuscular Disease,       Ulnar neuropathy, Bell's palsy Lt side                            Endocrine:  Diabetes, type 2         Morbid Obesity / BMI > 40  Psych:  Psychiatric History anxiety depression                Physical Exam  General: Well nourished, Cooperative, Alert and Oriented    Airway:  Mallampati: III   Mouth Opening: Normal  TM Distance: 4 - 6 cm  Tongue: Normal  Neck ROM: Normal ROM    Dental:  Intact        Anesthesia Plan  Type of Anesthesia, risks & benefits discussed:    Anesthesia Type: Gen Natural Airway  Intra-op Monitoring Plan: Standard ASA Monitors  Post Op Pain Control Plan: multimodal analgesia  Induction:  IV  Informed Consent: Informed consent signed with the Patient and all parties understand the risks and agree  with anesthesia plan.  All questions answered. Patient consented to blood products? No  ASA Score: 2  Day of Surgery Review of History & Physical: H&P Update referred to the surgeon/provider.    Ready For Surgery From Anesthesia Perspective.     .

## 2025-05-30 ENCOUNTER — ANESTHESIA (OUTPATIENT)
Dept: ENDOSCOPY | Facility: HOSPITAL | Age: 70
End: 2025-05-30
Payer: MEDICARE

## 2025-05-30 ENCOUNTER — HOSPITAL ENCOUNTER (OUTPATIENT)
Dept: ENDOSCOPY | Facility: HOSPITAL | Age: 70
Discharge: HOME | End: 2025-05-30
Attending: FAMILY MEDICINE
Payer: MEDICARE

## 2025-05-30 VITALS
OXYGEN SATURATION: 96 % | DIASTOLIC BLOOD PRESSURE: 67 MMHG | BODY MASS INDEX: 40.93 KG/M2 | TEMPERATURE: 97 F | RESPIRATION RATE: 18 BRPM | WEIGHT: 222.44 LBS | SYSTOLIC BLOOD PRESSURE: 103 MMHG | HEIGHT: 62 IN | HEART RATE: 68 BPM

## 2025-05-30 DIAGNOSIS — Z12.11 SCREEN FOR COLON CANCER: ICD-10-CM

## 2025-05-30 DIAGNOSIS — Z12.11 COLON CANCER SCREENING: Primary | ICD-10-CM

## 2025-05-30 LAB — POCT GLUCOSE: 93 MG/DL (ref 70–110)

## 2025-05-30 PROCEDURE — 82962 GLUCOSE BLOOD TEST: CPT | Performed by: INTERNAL MEDICINE

## 2025-05-30 PROCEDURE — 63600175 PHARM REV CODE 636 W HCPCS: Performed by: NURSE ANESTHETIST, CERTIFIED REGISTERED

## 2025-05-30 PROCEDURE — 37000008 HC ANESTHESIA 1ST 15 MINUTES

## 2025-05-30 PROCEDURE — 37000009 HC ANESTHESIA EA ADD 15 MINS

## 2025-05-30 RX ORDER — LIDOCAINE HYDROCHLORIDE 20 MG/ML
INJECTION INTRAVENOUS
Status: DISCONTINUED | OUTPATIENT
Start: 2025-05-30 | End: 2025-05-30

## 2025-05-30 RX ORDER — PROPOFOL 10 MG/ML
VIAL (ML) INTRAVENOUS
Status: DISCONTINUED | OUTPATIENT
Start: 2025-05-30 | End: 2025-05-30

## 2025-05-30 RX ORDER — SODIUM CHLORIDE, SODIUM LACTATE, POTASSIUM CHLORIDE, CALCIUM CHLORIDE 600; 310; 30; 20 MG/100ML; MG/100ML; MG/100ML; MG/100ML
INJECTION, SOLUTION INTRAVENOUS CONTINUOUS PRN
Status: DISCONTINUED | OUTPATIENT
Start: 2025-05-30 | End: 2025-05-30

## 2025-05-30 RX ADMIN — PROPOFOL 25 MG: 10 INJECTION, EMULSION INTRAVENOUS at 08:05

## 2025-05-30 RX ADMIN — PROPOFOL 50 MG: 10 INJECTION, EMULSION INTRAVENOUS at 07:05

## 2025-05-30 RX ADMIN — SODIUM CHLORIDE, SODIUM LACTATE, POTASSIUM CHLORIDE, AND CALCIUM CHLORIDE: .6; .31; .03; .02 INJECTION, SOLUTION INTRAVENOUS at 07:05

## 2025-05-30 RX ADMIN — PROPOFOL 25 MG: 10 INJECTION, EMULSION INTRAVENOUS at 07:05

## 2025-05-30 RX ADMIN — LIDOCAINE HYDROCHLORIDE 50 MG: 20 INJECTION INTRAVENOUS at 07:05

## 2025-05-30 NOTE — DISCHARGE INSTRUCTIONS
During your procedure today, you received medications for sedation.  These   medications may affect your judgment, balance and coordination.  Therefore,   for 24 hours, you have the following restrictions:   - DO NOT drive a car, operate machinery, make legal/financial decisions,   sign important papers or drink alcohol.    ACTIVITY:  Today: no heavy lifting, straining or running due to procedural   sedation/anesthesia.  The following day: return to full activity including work.  DIET:  Eat and drink normally unless instructed otherwise.                TREATMENT FOR COMMON SIDE EFFECTS:  - Mild abdominal pain, nausea, belching, bloating or excessive gas:  rest,   eat lightly and use a heating pad.  - Sore Throat: treat with throat lozenges and/or gargle with warm salt   water.  - Because air was used during the procedure, expelling large amounts of air   from your rectum or belching is normal.  - If a bowel prep was taken, you may not have a bowel movement for 1-3 days.    This is normal.  SYMPTOMS TO WATCH FOR AND REPORT TO YOUR PHYSICIAN:  1. Abdominal pain or bloating, other than gas cramps.  2. Chest pain.  3. Back pain.  4. Signs of infection such as: chills or fever occurring within 24 hours   after the procedure.  5. Rectal bleeding, which would show as bright red, maroon, or black stools.   (A tablespoon of blood from the rectum is not serious, especially if   hemorrhoids are present.)  6. Vomiting.  7. Weakness or dizziness.  GO DIRECTLY TO THE NEAREST EMERGENCY ROOM IF YOU HAVE ANY OF THE FOLLOWING:                 Difficulty breathing              Chills and/or fever over 101 F              Persistent vomiting and/or vomiting blood              Severe abdominal pain              Severe chest pain              Black, tarry stools              Bleeding- more than one tablespoon              Any other symptom or condition that you feel may need urgent attention    Your doctor recommends these additional  instructions:  If any biopsies were taken, your doctors clinic will contact you in 1 to 2   weeks with any results.  - Discharge patient to home.   - Resume previous diet.   - Continue present medications.       - Return to referring physician as previously scheduled.   - Patient has a contact number available for emergencies.  The signs and   symptoms of potential delayed complications were discussed with the   patient.  Return to normal activities tomorrow.  Written discharge   instructions were provided to the patient.  If you have any questions about the above instructions, call the GI   department at [phone] or call the endoscopy unit at [phone]   from 7am until 3 pm.  [facility] [place], EMERGENCY ROOM PHONE NUMBER:   [phone]  IF A COMPLICATION OR EMERGENCY SITUATION ARISES AND YOU ARE UNABLE TO REACH   YOUR PHYSICIAN - GO DIRECTLY TO THE EMERGENCY ROOM.  I have read or have had read to me these discharge instructions for my   procedure and have received a written copy.  I understand these   instructions and will follow-up with my physician if I have any questions.

## 2025-05-30 NOTE — ANESTHESIA POSTPROCEDURE EVALUATION
Anesthesia Post Evaluation    Patient: Kelsey Terrell    Procedure(s) Performed: * No procedures listed *    Final Anesthesia Type: general      Patient location during evaluation: PACU  Patient participation: Yes- Able to Participate  Level of consciousness: awake  Post-procedure vital signs: reviewed and stable  Pain management: adequate  Airway patency: patent    PONV status at discharge: No PONV  Anesthetic complications: no      Cardiovascular status: stable  Respiratory status: unassisted  Hydration status: euvolemic  Follow-up not needed.              Vitals Value Taken Time   BP 95/61 05/30/25 08:12     05/30/25 08:15   Pulse 69 05/30/25 08:15   Resp 17 05/30/25 08:15   SpO2 92 % 05/30/25 08:15   Vitals shown include unfiled device data.      No case tracking events are documented in the log.      Pain/Will Score: No data recorded

## 2025-05-30 NOTE — H&P
Endoscopy History and Physical    PCP - Harris Scott MD  Referring Physician - Harris Scott MD  [id number] [place] [address]      ASA - per anesthesia  Mallampati - per anesthesia  History of Anesthesia problems - no  Family history Anesthesia problems -  no   Plan of anesthesia - General    HPI  69 y.o. female    Planned Procedure: Colonoscopy  Diagnosis:screening for colon cancer  Chief Complaint: Same as above    Personnel H/o colon polyps:no  FH of colon cancer:no  Anticoagulation:no      ROS:  Constitutional: No fevers, chills, No weight loss  CV: No chest pain  Pulm: No cough, No shortness of breath  GI: see HPI    Medical History:  has a past medical history of Chronic bronchitis, Chronic depression, Difficult intubation, History of Bell's palsy (1980's), Hypercholesteremia, Hypertension, Mitral regurgitation, Morbid obesity, and Type 2 diabetes mellitus without complications.    Surgical History:  has a past surgical history that includes Tubal ligation; Tonsillectomy; Hysterectomy; closed reduction shoulder dislocation (Left); Breast biopsy (Left); and Oophorectomy.    Family History: family history includes Cancer in her daughter; Cataracts in her mother; Cervical cancer in her paternal aunt; Clotting disorder in her daughter; Diabetes in her daughter; Endometriosis in an other family member; Hypertension in her father; Liver disease in an other family member; Other in her daughter and daughter; Throat cancer in her father; Thyroid cancer (age of onset: 40) in her daughter..    Social History:  reports that she has never smoked. She has never used smokeless tobacco. She reports current alcohol use. She reports that she does not use drugs.    Review of patient's allergies indicates:  No Known Allergies    Medications:   Prescriptions Prior to Admission[1]    Physical Exam:    Vital Signs:   Vitals:    05/30/25 0556   BP: 116/67   Pulse: 78   Resp: 16   Temp: 97.1 °F (36.2 °C)        General Appearance: Well appearing in no acute distress  Abdomen: Soft, non tender, non distended with normal bowel sounds, no masses    Labs:  Lab Results   Component Value Date    WBC 6.90 02/05/2019    HGB 12.0 02/05/2019    HCT 39.8 02/05/2019     (H) 02/05/2019    CHOL 165 11/07/2024    TRIG 123 11/07/2024    HDL 32 (L) 11/07/2024    ALT 18 11/07/2024    AST 15 11/07/2024     11/07/2024    K 3.5 11/07/2024     11/07/2024    CREATININE 1.0 11/07/2024    BUN 23 11/07/2024    CO2 25 11/07/2024    TSH 3.628 04/05/2024    GLUF 108 02/27/2023    HGBA1C 5.1 03/25/2025       I have explained the risks and benefits of this endoscopic procedure to the patient including but not limited to bleeding, inflammation, infection, perforation, and death.    SEDATION PLAN: per anesthesia       History reviewed, vital signs satisfactory, cardiopulmonary status satisfactory, sedation options, risks and plans have been discussed with the patient  All their questions were answered and the patient agrees to the sedation procedures as planned and the patient is deemed an appropriate candidate for the sedation as planned.     The risks, benefits and alternatives of the procedure were discussed with the patient in detail. This discussion was had in the presence of endoscopy staff. The risks include, risks of adverse reaction to sedation requiring the use of reversal agents, bleeding requiring blood transfusion, perforation requiring surgical intervention and technical failure. Other risks include aspiration leading to respiratory distress and respiratory failure resulting in endotracheal intubation and mechanical ventilation including death. If anesthesia is being utilized for this procedure, it is up to the anesthesiologist to determine airway safety including elective endotracheal intubation. Questions were answered, they agree to proceed. There was no language barriers.       Procedure explained to patient,  informed consent obtained and placed in chart.       Franc Silva MD       [1] (Not in a hospital admission)

## 2025-05-30 NOTE — TRANSFER OF CARE
"Anesthesia Transfer of Care Note    Patient: Kelsey Terrell    Procedure(s) Performed: * No procedures listed *    Patient location: PACU    Anesthesia Type: general    Transport from OR: Transported from OR on room air with adequate spontaneous ventilation    Post pain: adequate analgesia    Post assessment: no apparent anesthetic complications    Post vital signs: stable    Level of consciousness: awake    Nausea/Vomiting: no nausea/vomiting    Complications: none    Transfer of care protocol was followed      Last vitals: Visit Vitals  /67 (BP Location: Right arm, Patient Position: Sitting)   Pulse 78   Temp 36.2 °C (97.1 °F) (Temporal)   Resp 16   Ht 5' 2" (1.575 m)   Wt 100.9 kg (222 lb 7.1 oz)   SpO2 95%   Breastfeeding No   BMI 40.69 kg/m²     "

## 2025-05-30 NOTE — PLAN OF CARE
Discharge instructions reviewed with patient and visitor. Handouts given & verbalized understanding with no further questions at this time. Dr. Silva spoke to pt at bedside, reviewed procedure and findings, answered questions. MD telephone number provided per AVS sheet. VSS on RA, no pain or nausea noted, no complaints noted. Fall precautions reviewed, consents in chart, PIV removed at this time.

## 2025-05-30 NOTE — DISCHARGE SUMMARY
[facility] - Endoscopy 1st Fl  Discharge Note  Short Stay    Colonoscopy      OUTCOME: Patient tolerated treatment/procedure well without complication and is now ready for discharge.    DISPOSITION: Home or Self Care    FINAL DIAGNOSIS:  <principal problem not specified>    FOLLOWUP: With primary care provider    DISCHARGE INSTRUCTIONS:  No discharge procedures on file.      Clinical Reference Documents Added to Patient Instructions         Document    [facility] (ENGLISH)            TIME SPENT ON DISCHARGE: 20 minutes

## 2025-06-14 DIAGNOSIS — I10 PRIMARY HYPERTENSION: ICD-10-CM

## 2025-06-16 RX ORDER — OLMESARTAN MEDOXOMIL, AMLODIPINE AND HYDROCHLOROTHIAZIDE TABLET 20/5/12.5 MG 20; 5; 12.5 MG/1; MG/1; MG/1
1 TABLET ORAL DAILY
Qty: 30 TABLET | Refills: 0 | Status: SHIPPED | OUTPATIENT
Start: 2025-06-16 | End: 2026-06-16

## 2025-07-10 DIAGNOSIS — I10 PRIMARY HYPERTENSION: ICD-10-CM

## 2025-07-10 NOTE — TELEPHONE ENCOUNTER
Refill Routing Note   Medication(s) are not appropriate for processing by [facility] for the following reason(s):        No active prescription written by provider    [facility] action(s):  Defer        Medication Therapy Plan: FLOS      Appointments  past 12m or future 3m with PCP    Date Provider   Last Visit   4/8/2025 Harris Scott MD   Next Visit   10/16/2025 Harris Scott MD   ED visits in past 90 days: 0        Note composed:10:10 AM 07/10/2025

## 2025-07-10 NOTE — TELEPHONE ENCOUNTER
Care Due:                  Date            Visit Type   Department     Provider  --------------------------------------------------------------------------------                                EP -                              PRIMARY      [facility] INTER[facility]  Last Visit: 04-      CARE ([facility])   KRISSY Scott                              EP -                              PRIMARY      [facility] INTER[facility]  Next Visit: 10-      CARE ([facility])   KRISSY Scott                                                            Last  Test          Frequency    Reason                     Performed    Due Date  --------------------------------------------------------------------------------    HBA1C.......  6 months...  tirzepatide..............  03- 09-    [facility] Embedded Care Due Messages. Reference number: [id number].   7/10/2025 9:33:54 AM JOAQUINT

## 2025-07-15 RX ORDER — OLMESARTAN MEDOXOMIL, AMLODIPINE AND HYDROCHLOROTHIAZIDE TABLET 20/5/12.5 MG 20; 5; 12.5 MG/1; MG/1; MG/1
1 TABLET ORAL DAILY
Qty: 30 TABLET | Refills: 11 | Status: SHIPPED | OUTPATIENT
Start: 2025-07-15

## 2025-08-27 ENCOUNTER — TELEPHONE (OUTPATIENT)
Dept: INTERNAL MEDICINE | Facility: CLINIC | Age: 70
End: 2025-08-27
Payer: MEDICARE